# Patient Record
Sex: MALE | Race: WHITE | HISPANIC OR LATINO | Employment: FULL TIME | URBAN - METROPOLITAN AREA
[De-identification: names, ages, dates, MRNs, and addresses within clinical notes are randomized per-mention and may not be internally consistent; named-entity substitution may affect disease eponyms.]

---

## 2017-01-13 ENCOUNTER — HOSPITAL ENCOUNTER (EMERGENCY)
Facility: CLINIC | Age: 52
Discharge: HOME | End: 2017-01-13
Attending: EMERGENCY MEDICINE | Admitting: EMERGENCY MEDICINE
Payer: MEDICAID

## 2017-01-13 VITALS
OXYGEN SATURATION: 98 % | TEMPERATURE: 96 F | HEIGHT: 70 IN | DIASTOLIC BLOOD PRESSURE: 103 MMHG | BODY MASS INDEX: 27.2 KG/M2 | SYSTOLIC BLOOD PRESSURE: 167 MMHG | WEIGHT: 190 LBS | RESPIRATION RATE: 16 BRPM | HEART RATE: 78 BPM

## 2017-01-13 DIAGNOSIS — K04.01 PULPITIS: ICD-10-CM

## 2017-01-13 DIAGNOSIS — S02.5XXA CLOSED FRACTURE OF TOOTH, INITIAL ENCOUNTER: ICD-10-CM

## 2017-01-13 PROCEDURE — 99282 EMERGENCY DEPT VISIT SF MDM: CPT | Performed by: EMERGENCY MEDICINE

## 2017-01-13 PROCEDURE — 99284 EMERGENCY DEPT VISIT MOD MDM: CPT | Mod: Z6 | Performed by: EMERGENCY MEDICINE

## 2017-01-13 RX ORDER — OXYCODONE HYDROCHLORIDE 5 MG/1
5 TABLET ORAL EVERY 6 HOURS PRN
Qty: 10 TABLET | Refills: 0 | Status: SHIPPED | OUTPATIENT
Start: 2017-01-13 | End: 2017-11-15

## 2017-01-13 RX ORDER — PENICILLIN V POTASSIUM 500 MG/1
500 TABLET, FILM COATED ORAL 3 TIMES DAILY
Qty: 30 TABLET | Refills: 0 | Status: SHIPPED | OUTPATIENT
Start: 2017-01-13 | End: 2017-01-23

## 2017-01-13 ASSESSMENT — ENCOUNTER SYMPTOMS
TROUBLE SWALLOWING: 0
VOICE CHANGE: 0
SORE THROAT: 0
FEVER: 0

## 2017-01-13 NOTE — ED PROVIDER NOTES
History     Chief Complaint   Patient presents with     Dental Problem     HPI  Augustin Hernandez is a 51 year old male who presents for evaluation of dental pain. The patient reports that he has had ongoing dental pain of his right upper lateral incisor, which had a crack in it, and has been following with his dentist through [facility] about this. He states that he had dental appointment scheduled on 3 February to have this tooth pulled. 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 He states that this injury broke his incisor in half, and since then he has had worse pain and sensitivity persisting. He states that he has been taking ibuprofen around the clock and this has not been helping. He denies any trouble swallowing or sore throat, and he states he is eating and breathing normally except that these actions elicit pain. He denies any change in any of his other teeth new since this injury. He reports that he's not on any antibiotics.    I have reviewed the Medications, Allergies, Past Medical and Surgical History, and Social History in the Epic system.    No current facility-administered medications for this encounter.     Current Outpatient Prescriptions   Medication     penicillin V potassium (VEETID) 500 MG tablet     oxyCODONE (ROXICODONE) 5 MG IR tablet     IBUPROFEN PO     lisinopril (PRINIVIL,ZESTRIL) 20 MG tablet     UNKNOWN TO PATIENT     Past Medical History   Diagnosis Date     Hypertension        History reviewed. No pertinent past surgical history.    No family history on file.    Social History   Substance Use Topics     Smoking status: Current Every Day Smoker -- 1.00 packs/day     Smokeless tobacco: Not on file     Alcohol Use: No      No Known Allergies    Review of Systems   Constitutional: Negative for fever.   HENT: Positive for dental problem (right upper canine  "broken in half, painful and sensitive). Negative for sore throat, trouble swallowing and voice change.    All other systems reviewed and are negative.      Physical Exam   BP: (!) 167/103 mmHg  Pulse: 78  Temp: 96  F (35.6  C)  Resp: 16  Height: 177.8 cm (5' 10\")  Weight: 86.183 kg (190 lb)  SpO2: 98 %  Physical Exam  General: patient is alert and oriented and in no acute distress   Head: atraumatic and normocephalic   EENT: moist mucus membranes without tonsillar erythema or exudates, right upper lateral incisor is decayed and broken in half with dentin exposure, no pink area of pulp that is visualized, surrounding gingiva is erythematous and slightly edematous without fluctuance, no trismus, no induration of the submandibular tissue, pupils round and reactive   Neck: supple   Cardiovascular: regular rate and rhythm  Pulmonary: no respiratory distress  Musculoskeletal: normal range of motion   Neurological: alert and oriented, moving all extremities symmetrically, gait normal   Skin: warm, dry     ED Course   Procedures       12:37 AM  The patient was seen and examined by Geovanna Mccloud MD, in Room 20.        Critical Care time:  none               Labs Ordered and Resulted from Time of ED Arrival Up to the Time of Departure from the ED - No data to display    Assessments & Plan (with Medical Decision Making)    is a 51 year old male who presents for evaluation of dental pain.  History and exam are consistent with a dental fracture and associated pulpitis.  He does not have evidence of abscess that would require incision and drainage at this time.  He has no evidence of facial swelling or some associated cellulitis.  No trismus on exam.  He was given a prescription for penicillin and instructed to use ibuprofen regularly.  He was given oxycodone for breakthrough pain.  He is instructed to call his dentist in the morning to schedule an urgent follow-up.  He was given close return instructions and voiced " understanding.  Will discharge home.      I have reviewed the nursing notes.    I have reviewed the findings, diagnosis, plan and need for follow up with the patient.    Discharge Medication List as of 1/13/2017  1:00 AM      START taking these medications    Details   penicillin V potassium (VEETID) 500 MG tablet Take 1 tablet (500 mg) by mouth 3 times daily for 10 days, Disp-30 tablet, R-0, Local Print      oxyCODONE (ROXICODONE) 5 MG IR tablet Take 1 tablet (5 mg) by mouth every 6 hours as needed for pain, Disp-10 tablet, R-0, Local Print             Final diagnoses:   Closed fracture of tooth, initial encounter   Pulpitis     IAlli, am serving as a trained medical scribe to document services personally performed by Geovanna Mccloud MD, based on the provider's statements to me.      Geovanna MAO MD, was physically present and have reviewed and verified the accuracy of this note documented by Alli Turner.    1/13/2017   [facility], [place], EMERGENCY DEPARTMENT      Geovanna Mccloud MD  01/13/17 0311

## 2017-01-13 NOTE — ED AVS SNAPSHOT
[facility], Emergency Department    [address] [facility] [address]    Phone:  [phone]                                       Augustin Hernandez   MRN: [id number]    Department:  [facility], Emergency Department   Date of Visit:  1/13/2017           Patient Information     Date Of Birth          1965        Your diagnoses for this visit were:     Closed fracture of tooth, initial encounter     Pulpitis        You were seen by Geovanna Mccloud MD.        Discharge Instructions       Please make an appointment to follow up with your dentist to follow-up as soon as possible.  Let them know that you have a broken tooth with exposed dentin.  Take penicillin as prescribed.  Take 600 mg ibuprofen every 6 hours for no more than 5 days.  Use oxycodone as needed for break through pain.  If you develop worsening pain, fevers, facial swelling, difficulty swallowing or other concerns return to the emergency department for re-evaluation.      Dental Infection    An abscess is a pocket of pus at the tip of a tooth root in your jaw bone. It is caused by an infection at the root of the tooth. It can cause pain and swelling of the gum, cheek, or jaw. Pain may spread from the tooth to your ear or the area of your jaw on the same side. If the abscess isn t treated, it spreads to the gum near the tooth. This causes more swelling and pain. More serious infections cause your face to swell.  A dental abscess usually starts with a crack or cavity in the tooth. The pain is often made worse by drinking hot or cold fluids, or biting on hard foods.  Home care  Follow these guidelines when caring for yourself at home:    Avoid hot and cold foods and drinks. Your tooth may be sensitive to changes in temperature. Don t chew on the side of the infected tooth.    If your tooth is chipped or cracked, or if there is a large open cavity, put oil of cloves directly on the tooth to relieve pain. You can buy oil of  "cloves at drugstores. Some pharmacies carry an over-the-counter \"toothache kit.\" This contains a paste that you can put on the exposed tooth to make it less sensitive.    Put a cold pack on your jaw over the sore area to help reduce pain.    You may use acetaminophen or ibuprofen to ease pain, unless another medicine was prescribed. Note: If you have chronic liver or kidney disease, talk with your health care provider before using these medicines. Also talk with your provider if you ve had a stomach ulcer or GI bleeding.    An antibiotic will be prescribed. Take it until finished, even if you are feeling better after a few days.  Follow-up care  Follow up as directed with your dentist or an oral surgeon. Once an infection occurs in a tooth, it will continue to be a problem until the infection is drained. This is done through surgery or a root canal. Or you may need to have your tooth pulled.  When to seek medical advice  Call your health care provider right away if any of these occur:    Your face becomes more swollen or red    Your eyelids become swollen    Pain gets worse or spreads to your neck    Fever over 100.4  F (38.0  C)    Unusual drowsiness    Headache or stiff neck    Weakness or fainting    Pus drains from the tooth    Difficulty swallowing or breathing    0592-1082 [employer]. [address] [place] [address], [place] [address]. All rights reserved. This information is not intended as a substitute for professional medical care. Always follow your healthcare professional's instructions.                24 Hour Appointment Hotline       To make an appointment at any [facility], call [phone] ([phone]). If you don't have a family doctor or clinic, we will help you find one. [place] clinics are conveniently located to serve the needs of you and your family.             Review of your medicines      START taking        Dose / Directions Last dose taken    oxyCODONE 5 MG IR tablet " "  Commonly known as:  ROXICODONE   Dose:  5 mg   Quantity:  10 tablet        Take 1 tablet (5 mg) by mouth every 6 hours as needed for pain   Refills:  0        penicillin V potassium 500 MG tablet   Commonly known as:  VEETID   Dose:  500 mg   Quantity:  30 tablet        Take 1 tablet (500 mg) by mouth 3 times daily for 10 days   Refills:  0          Our records show that you are taking the medicines listed below. If these are incorrect, please call your family doctor or clinic.        Dose / Directions Last dose taken    IBUPROFEN PO        Refills:  0        lisinopril 20 MG tablet   Commonly known as:  PRINIVIL/ZESTRIL   Dose:  20 mg   Quantity:  30 tablet        Take 1 tablet by mouth daily.   Refills:  1        UNKNOWN TO PATIENT        Refills:  0                Prescriptions were sent or printed at these locations (2 Prescriptions)                   Other Prescriptions                Printed at Department/Unit printer (2 of 2)         penicillin V potassium (VEETID) 500 MG tablet               oxyCODONE (ROXICODONE) 5 MG IR tablet                Orders Needing Specimen Collection     None      Pending Results     No orders found from 1/12/2017 to 1/14/2017.            Pending Culture Results     No orders found from 1/12/2017 to 1/14/2017.            Thank you for choosing [facility]       Thank you for choosing [facility] for your care. Our goal is always to provide you with excellent care. Hearing back from our patients is one way we can continue to improve our services. Please take a few minutes to complete the written survey that you may receive in the mail after you visit with us. Thank you!        [employer] Information     [employer] lets you send messages to your doctor, view your test results, renew your prescriptions, schedule appointments and more. To sign up, go to [email or web address] . Click on \"Log in\" on the left side of the screen, which will take you to the Welcome page. Then click on \"Sign up " "Now\" on the right side of the page.     You will be asked to enter the access code listed below, as well as some personal information. Please follow the directions to create your username and password.     Your access code is: [email or web address]  Expires: 2017 12:49 AM     Your access code will  in 90 days. If you need help or a new code, please call your [facility] clinic or [phone].        Care EveryWhere ID     This is your Care EveryWhere ID. This could be used by other organizations to access your [facility] medical records  [id number]        After Visit Summary       This is your record. Keep this with you and show to your community pharmacist(s) and doctor(s) at your next visit.                  "

## 2017-01-13 NOTE — ED AVS SNAPSHOT
[facility], [place], Emergency Department    [address] [place] [address]    Phone:  [phone]                                       Augustin Hernandez   MRN: [id number]    Department:  [facility], Emergency Department   Date of Visit:  1/13/2017           After Visit Summary Signature Page     I have received my discharge instructions, and my questions have been answered. I have discussed any challenges I see with this plan with the nurse or doctor.    ..........................................................................................................................................  Patient/Patient Representative Signature      ..........................................................................................................................................  Patient Representative Print Name and Relationship to Patient    ..................................................               ................................................  Date                                            Time    ..........................................................................................................................................  Reviewed by Signature/Title    ...................................................              ..............................................  Date                                                            Time

## 2017-01-13 NOTE — ED NOTES
Pt presents in triage reporting breaking front teeth 3 days ago. Reports increasing pain. Reports taking >15 ibuprofen throughout day w/ no improvement.

## 2017-01-13 NOTE — DISCHARGE INSTRUCTIONS
"Please make an appointment to follow up with your dentist to follow-up as soon as possible.  Let them know that you have a broken tooth with exposed dentin.  Take penicillin as prescribed.  Take 600 mg ibuprofen every 6 hours for no more than 5 days.  Use oxycodone as needed for break through pain.  If you develop worsening pain, fevers, facial swelling, difficulty swallowing or other concerns return to the emergency department for re-evaluation.      Dental Infection    An abscess is a pocket of pus at the tip of a tooth root in your jaw bone. It is caused by an infection at the root of the tooth. It can cause pain and swelling of the gum, cheek, or jaw. Pain may spread from the tooth to your ear or the area of your jaw on the same side. If the abscess isn t treated, it spreads to the gum near the tooth. This causes more swelling and pain. More serious infections cause your face to swell.  A dental abscess usually starts with a crack or cavity in the tooth. The pain is often made worse by drinking hot or cold fluids, or biting on hard foods.  Home care  Follow these guidelines when caring for yourself at home:    Avoid hot and cold foods and drinks. Your tooth may be sensitive to changes in temperature. Don t chew on the side of the infected tooth.    If your tooth is chipped or cracked, or if there is a large open cavity, put oil of cloves directly on the tooth to relieve pain. You can buy oil of cloves at drugstores. Some pharmacies carry an over-the-counter \"toothache kit.\" This contains a paste that you can put on the exposed tooth to make it less sensitive.    Put a cold pack on your jaw over the sore area to help reduce pain.    You may use acetaminophen or ibuprofen to ease pain, unless another medicine was prescribed. Note: If you have chronic liver or kidney disease, talk with your health care provider before using these medicines. Also talk with your provider if you ve had a stomach ulcer or GI " bleeding.    An antibiotic will be prescribed. Take it until finished, even if you are feeling better after a few days.  Follow-up care  Follow up as directed with your dentist or an oral surgeon. Once an infection occurs in a tooth, it will continue to be a problem until the infection is drained. This is done through surgery or a root canal. Or you may need to have your tooth pulled.  When to seek medical advice  Call your health care provider right away if any of these occur:    Your face becomes more swollen or red    Your eyelids become swollen    Pain gets worse or spreads to your neck    Fever over 100.4  F (38.0  C)    Unusual drowsiness    Headache or stiff neck    Weakness or fainting    Pus drains from the tooth    Difficulty swallowing or breathing    0738-4688 [employer]. [address] [place] [address], [place] [address]. All rights reserved. This information is not intended as a substitute for professional medical care. Always follow your healthcare professional's instructions.

## 2017-09-17 ENCOUNTER — HOSPITAL ENCOUNTER (EMERGENCY)
Facility: CLINIC | Age: 52
Discharge: HOME | End: 2017-09-18
Attending: EMERGENCY MEDICINE | Admitting: EMERGENCY MEDICINE
Payer: COMMERCIAL

## 2017-09-17 ENCOUNTER — NURSE TRIAGE (OUTPATIENT)
Dept: NURSING | Facility: CLINIC | Age: 52
End: 2017-09-17

## 2017-09-17 DIAGNOSIS — I10 ESSENTIAL HYPERTENSION: ICD-10-CM

## 2017-09-17 PROCEDURE — 99284 EMERGENCY DEPT VISIT MOD MDM: CPT | Mod: Z6 | Performed by: EMERGENCY MEDICINE

## 2017-09-17 PROCEDURE — 99283 EMERGENCY DEPT VISIT LOW MDM: CPT | Performed by: EMERGENCY MEDICINE

## 2017-09-17 NOTE — ED AVS SNAPSHOT
[facility], [place], Emergency Department    [address]    [facility] [address]    Phone:  [phone]    Fax:  [phone]                                       Augustin Hernandez   MRN: [id number]    Department:  [facility], Emergency Department   Date of Visit:  9/17/2017           After Visit Summary Signature Page     I have received my discharge instructions, and my questions have been answered. I have discussed any challenges I see with this plan with the nurse or doctor.    ..........................................................................................................................................  Patient/Patient Representative Signature      ..........................................................................................................................................  Patient Representative Print Name and Relationship to Patient    ..................................................               ................................................  Date                                            Time    ..........................................................................................................................................  Reviewed by Signature/Title    ...................................................              ..............................................  Date                                                            Time

## 2017-09-17 NOTE — ED AVS SNAPSHOT
[facility], Emergency Department    [address]    [place] [address]    Phone:  [phone]    Fax:  [phone]                                       Augustin Hernandez   MRN: [id number]    Department:  [facility], Emergency Department   Date of Visit:  9/17/2017           Patient Information     Date Of Birth          1965        Your diagnoses for this visit were:     Essential hypertension        You were seen by Bola Wood MD.      Follow-up Information     Follow up with Clinic, [facility].    Contact information:    [address] [facility] [address]  [phone]          Discharge Instructions       Take blood pressure medication as directed.  Take ibuprofen if needed for pain.  Clinic follow up this week with your primary care clinic provider.  Return if persistent symptoms.    24 Hour Appointment Hotline       To make an appointment at any [facility], [place], call [phone] ([phone]). If you don't have a family doctor or clinic, we will help you find one. [facility] clinics are conveniently located to serve the needs of you and your family.             Review of your medicines      START taking        Dose / Directions Last dose taken    lisinopril-hydrochlorothiazide 20-25 MG per tablet   Commonly known as:  PRINZIDE/ZESTORETIC   Dose:  1 tablet   Quantity:  30 tablet        Take 1 tablet by mouth daily   Refills:  0          CONTINUE these medicines which may have CHANGED, or have new prescriptions. If we are uncertain of the size of tablets/capsules you have at home, strength may be listed as something that might have changed.        Dose / Directions Last dose taken    ibuprofen 800 MG tablet   Commonly known as:  ADVIL/MOTRIN   Dose:  800 mg   What changed:    - medication strength  - how much to take  - when to take this  - reasons to take this   Quantity:  40 tablet        Take 1 tablet (800 mg) by mouth every 8 hours as  needed for moderate pain   Refills:  0          Our records show that you are taking the medicines listed below. If these are incorrect, please call your family doctor or clinic.        Dose / Directions Last dose taken    oxyCODONE 5 MG IR tablet   Commonly known as:  ROXICODONE   Dose:  5 mg   Quantity:  10 tablet        Take 1 tablet (5 mg) by mouth every 6 hours as needed for pain   Refills:  0        UNKNOWN TO PATIENT        Refills:  0          STOP taking        Dose Reason for stopping Comments    lisinopril 20 MG tablet   Commonly known as:  PRINIVIL/ZESTRIL                      Prescriptions were sent or printed at these locations (2 Prescriptions)                   Other Prescriptions                Printed at Department/Unit printer (2 of 2)         ibuprofen (ADVIL/MOTRIN) 800 MG tablet               lisinopril-hydrochlorothiazide (PRINZIDE/ZESTORETIC) 20-25 MG per tablet                Orders Needing Specimen Collection     None      Pending Results     No orders found for last 3 day(s).            Pending Culture Results     No orders found for last 3 day(s).            Pending Results Instructions     If you had any lab results that were not finalized at the time of your Discharge, you can call the ED Lab Result RN at [phone]. You will be contacted by this team for any positive Lab results or changes in treatment. The nurses are available 7 days a week from 10A to 6:30P.  You can leave a message 24 hours per day and they will return your call.        Thank you for choosing [facility]       Thank you for choosing [facility] for your care. Our goal is always to provide you with excellent care. Hearing back from our patients is one way we can continue to improve our services. Please take a few minutes to complete the written survey that you may receive in the mail after you visit with us. Thank you!        [employer] Information     [employer] lets you send messages to your doctor, view your test results,  "renew your prescriptions, schedule appointments and more. To sign up, go to [email or web address] . Click on \"Log in\" on the left side of the screen, which will take you to the Welcome page. Then click on \"Sign up Now\" on the right side of the page.     You will be asked to enter the access code listed below, as well as some personal information. Please follow the directions to create your username and password.     Your access code is: [email or web address]  Expires: 2017  1:13 AM     Your access code will  in 90 days. If you need help or a new code, please call your [facility] clinic or [phone].        Care EveryWhere ID     This is your Care EveryWhere ID. This could be used by other organizations to access your [facility] medical records  [id number]        Equal Access to Services     [facility] : Rashawn Dawkins, anuardha gonzalez, piter self, magda joseph . So [facility] [phone].    ATENCIÓN: Si habla español, tiene a oro disposición servicios gratuitos de asistencia lingüística. Llame al [phone].    We comply with applicable federal civil rights laws and [place] laws. We do not discriminate on the basis of race, color, national origin, age, disability sex, sexual orientation or gender identity.            After Visit Summary       This is your record. Keep this with you and show to your community pharmacist(s) and doctor(s) at your next visit.                  "

## 2017-09-18 VITALS
RESPIRATION RATE: 18 BRPM | WEIGHT: 191.7 LBS | SYSTOLIC BLOOD PRESSURE: 156 MMHG | OXYGEN SATURATION: 98 % | DIASTOLIC BLOOD PRESSURE: 112 MMHG | TEMPERATURE: 97.4 F | BODY MASS INDEX: 27.51 KG/M2

## 2017-09-18 PROCEDURE — 25000132 ZZH RX MED GY IP 250 OP 250 PS 637: Performed by: EMERGENCY MEDICINE

## 2017-09-18 RX ORDER — LISINOPRIL 20 MG/1
20 TABLET ORAL DAILY
Qty: 30 TABLET | Refills: 1 | Status: SHIPPED | OUTPATIENT
Start: 2017-09-18 | End: 2017-09-18

## 2017-09-18 RX ORDER — IBUPROFEN 800 MG/1
800 TABLET, FILM COATED ORAL ONCE
Status: COMPLETED | OUTPATIENT
Start: 2017-09-18 | End: 2017-09-18

## 2017-09-18 RX ORDER — LISINOPRIL AND HYDROCHLOROTHIAZIDE 20; 25 MG/1; MG/1
1 TABLET ORAL DAILY
Qty: 30 TABLET | Refills: 0 | Status: SHIPPED | OUTPATIENT
Start: 2017-09-18 | End: 2019-01-14

## 2017-09-18 RX ORDER — IBUPROFEN 800 MG/1
800 TABLET, FILM COATED ORAL EVERY 8 HOURS PRN
Qty: 40 TABLET | Refills: 0 | Status: SHIPPED | OUTPATIENT
Start: 2017-09-18 | End: 2017-09-26

## 2017-09-18 RX ORDER — LISINOPRIL AND HYDROCHLOROTHIAZIDE 20; 25 MG/1; MG/1
1 TABLET ORAL ONCE
Status: COMPLETED | OUTPATIENT
Start: 2017-09-18 | End: 2017-09-18

## 2017-09-18 RX ADMIN — IBUPROFEN 800 MG: 800 TABLET ORAL at 00:27

## 2017-09-18 RX ADMIN — LISINOPRIL AND HYDROCHLOROTHIAZIDE 1 TABLET: 25; 20 TABLET ORAL at 01:06

## 2017-09-18 ASSESSMENT — ENCOUNTER SYMPTOMS
LIGHT-HEADEDNESS: 0
FACIAL SWELLING: 0
SHORTNESS OF BREATH: 0
ABDOMINAL PAIN: 0
DIAPHORESIS: 0
NECK STIFFNESS: 0
RHINORRHEA: 0
SINUS PRESSURE: 0
BACK PAIN: 0
DIZZINESS: 0
DIFFICULTY URINATING: 0
HEADACHES: 0
BRUISES/BLEEDS EASILY: 0
CONFUSION: 0
MYALGIAS: 0
CONSTITUTIONAL NEGATIVE: 1
FLANK PAIN: 0
NAUSEA: 0
NECK PAIN: 0
FATIGUE: 0
SEIZURES: 0
VOMITING: 0
DYSURIA: 0
FEVER: 0
JOINT SWELLING: 0
SORE THROAT: 0
NUMBNESS: 0
DIARRHEA: 0
ARTHRALGIAS: 0
UNEXPECTED WEIGHT CHANGE: 0
CHEST TIGHTNESS: 0
WEAKNESS: 0
CHILLS: 0
COUGH: 0
PALPITATIONS: 0

## 2017-09-18 NOTE — DISCHARGE INSTRUCTIONS
Take blood pressure medication as directed.  Take ibuprofen if needed for pain.  Clinic follow up this week with your primary care clinic provider.  Return if persistent symptoms.

## 2017-09-18 NOTE — TELEPHONE ENCOUNTER
"  Reason for Disposition    [1] SEVERE headache (e.g., excruciating) AND [2] \"worst headache\" of life    Protocols used: HEADACHE-ADULT-AH    "

## 2017-09-18 NOTE — ED PROVIDER NOTES
History     Chief Complaint   Patient presents with     Hypertension     Pt ran out of lisinopril & clonidine last week, feels BP is high.     Headache     x 1 day     HPI  Augustin Hernandez is a 52 year old male who presents with history of hypertension who presents stating he is out of his antihypertensive medication. Mild headache. No neurologic symptoms. No chest pain or dyspnea. No exertional symptoms. No leg swelling. No visual loss. No recent illness or injury. Does report some soreness of his back that is aggravated by movement. No trauma. No  symptoms. No fever or chills. No saddle area anesthesia. No sensory or motor symptoms. No bowel or bladder dysfunction. No incontinence or retention. No IV drug use.     I have reviewed the Medications, Allergies, Past Medical and Surgical History, and Social History in the [facility] system.  Past Medical History:   Diagnosis Date     Hypertension        Review of Systems   Constitutional: Negative.  Negative for chills, diaphoresis, fatigue, fever and unexpected weight change.   HENT: Negative for congestion, facial swelling, mouth sores, rhinorrhea, sinus pressure and sore throat.    Eyes: Negative for visual disturbance.   Respiratory: Negative for cough, chest tightness and shortness of breath.    Cardiovascular: Negative for chest pain, palpitations and leg swelling.   Gastrointestinal: Negative for abdominal pain, diarrhea, nausea and vomiting.   Genitourinary: Negative for difficulty urinating, dysuria and flank pain.   Musculoskeletal: Negative for arthralgias, back pain, joint swelling, myalgias, neck pain and neck stiffness.   Skin: Negative for rash.   Allergic/Immunologic: Negative for immunocompromised state.   Neurological: Negative for dizziness, seizures, syncope, weakness, light-headedness, numbness and headaches.   Hematological: Does not bruise/bleed easily.   Psychiatric/Behavioral: Negative for confusion.       Physical Exam   BP: (!)  155/103  Heart Rate: 70  Temp: 96.1  F (35.6  C)  Resp: 18  Weight: 87 kg (191 lb 11.2 oz)  SpO2: 99 %  Physical Exam   Constitutional: He is oriented to person, place, and time. He appears well-developed and well-nourished. No distress.   HENT:   Head: Normocephalic and atraumatic.   Mouth/Throat: Oropharynx is clear and moist.   Eyes: Conjunctivae and EOM are normal. Pupils are equal, round, and reactive to light.   Visual fundi normal.   Neck: Normal range of motion. Neck supple.   Cardiovascular: Normal rate, regular rhythm, normal heart sounds and intact distal pulses.    Pulmonary/Chest: Effort normal and breath sounds normal. No respiratory distress.   Abdominal: Soft. Bowel sounds are normal. There is no tenderness.   Musculoskeletal: Normal range of motion. He exhibits no edema or tenderness.   Neurological: He is alert and oriented to person, place, and time. He has normal strength and normal reflexes. He displays normal reflexes. No cranial nerve deficit or sensory deficit. Coordination normal.   Normal, non-focal exam.   Skin: Skin is warm and dry. No rash noted.   Psychiatric: He has a normal mood and affect. His behavior is normal.   Nursing note and vitals reviewed.      ED Course     ED Course     Procedures             Critical Care time:  none             Labs Ordered and Resulted from Time of ED Arrival Up to the Time of Departure from the ED - No data to display         Assessments & Plan (with Medical Decision Making)   Essential hypertension. No evidence of hypertensive emergency. Back pain associated with paravertebral tenderness most likely muscle strain. Will restart lisinopril-HCTZ. Cannot find any record of clonidine so will have him follow up with his primary care provider regarding this. Ibuprofen as needed for pain in back. Clinic follow up this week. Discussed the importance of maintaining compliance with antihypertensive medications. Return if persistent symptoms.     I have reviewed  the nursing notes.    I have reviewed the findings, diagnosis, plan and need for follow up with the patient.    New Prescriptions    IBUPROFEN (ADVIL/MOTRIN) 800 MG TABLET    Take 1 tablet (800 mg) by mouth every 8 hours as needed for moderate pain    LISINOPRIL-HYDROCHLOROTHIAZIDE (PRINZIDE/ZESTORETIC) 20-25 MG PER TABLET    Take 1 tablet by mouth daily       Final diagnoses:   Essential hypertension   I, Waldemar Jimenez, am serving as a trained medical scribe to document services personally performed by Bola Wood MD, based on the provider's statements to me.      IBola MD, was physically present and have reviewed and verified the accuracy of this note documented by Waldemar Jimenez.       9/17/2017   [facility], [place], EMERGENCY DEPARTMENT     Bola Wood MD  09/18/17 0142

## 2017-11-15 ENCOUNTER — HOSPITAL ENCOUNTER (EMERGENCY)
Facility: CLINIC | Age: 52
Discharge: HOME | End: 2017-11-16
Attending: EMERGENCY MEDICINE | Admitting: EMERGENCY MEDICINE
Payer: COMMERCIAL

## 2017-11-15 DIAGNOSIS — K08.89 PAIN, DENTAL: ICD-10-CM

## 2017-11-15 LAB
BASOPHILS # BLD AUTO: 0 10E9/L (ref 0–0.2)
BASOPHILS NFR BLD AUTO: 0.1 %
CO2 BLDCOV-SCNC: 25 MMOL/L (ref 21–28)
DIFFERENTIAL METHOD BLD: NORMAL
EOSINOPHIL # BLD AUTO: 0.2 10E9/L (ref 0–0.7)
EOSINOPHIL NFR BLD AUTO: 1.5 %
ERYTHROCYTE [DISTWIDTH] IN BLOOD BY AUTOMATED COUNT: 12.2 % (ref 10–15)
HCT VFR BLD AUTO: 49 % (ref 40–53)
HGB BLD-MCNC: 17.3 G/DL (ref 13.3–17.7)
IMM GRANULOCYTES # BLD: 0 10E9/L (ref 0–0.4)
IMM GRANULOCYTES NFR BLD: 0.2 %
LACTATE BLD-SCNC: 1.4 MMOL/L (ref 0.7–2.1)
LYMPHOCYTES # BLD AUTO: 2.5 10E9/L (ref 0.8–5.3)
LYMPHOCYTES NFR BLD AUTO: 24.6 %
MCH RBC QN AUTO: 32.6 PG (ref 26.5–33)
MCHC RBC AUTO-ENTMCNC: 35.3 G/DL (ref 31.5–36.5)
MCV RBC AUTO: 93 FL (ref 78–100)
MONOCYTES # BLD AUTO: 0.6 10E9/L (ref 0–1.3)
MONOCYTES NFR BLD AUTO: 5.8 %
NEUTROPHILS # BLD AUTO: 6.9 10E9/L (ref 1.6–8.3)
NEUTROPHILS NFR BLD AUTO: 67.8 %
NRBC # BLD AUTO: 0 10*3/UL
NRBC BLD AUTO-RTO: 0 /100
PCO2 BLDV: 39 MM HG (ref 40–50)
PH BLDV: 7.41 PH (ref 7.32–7.43)
PLATELET # BLD AUTO: 184 10E9/L (ref 150–450)
PO2 BLDV: 81 MM HG (ref 25–47)
RBC # BLD AUTO: 5.3 10E12/L (ref 4.4–5.9)
SAO2 % BLDV FROM PO2: 96 %
WBC # BLD AUTO: 10.2 10E9/L (ref 4–11)

## 2017-11-15 PROCEDURE — 64400 NJX AA&/STRD TRIGEMINAL NRV: CPT | Mod: Z6 | Performed by: EMERGENCY MEDICINE

## 2017-11-15 PROCEDURE — 64400 NJX AA&/STRD TRIGEMINAL NRV: CPT | Performed by: EMERGENCY MEDICINE

## 2017-11-15 PROCEDURE — 82803 BLOOD GASES ANY COMBINATION: CPT

## 2017-11-15 PROCEDURE — 99283 EMERGENCY DEPT VISIT LOW MDM: CPT | Mod: 25 | Performed by: EMERGENCY MEDICINE

## 2017-11-15 PROCEDURE — 83605 ASSAY OF LACTIC ACID: CPT

## 2017-11-15 PROCEDURE — 85025 COMPLETE CBC W/AUTO DIFF WBC: CPT | Performed by: EMERGENCY MEDICINE

## 2017-11-15 PROCEDURE — 80053 COMPREHEN METABOLIC PANEL: CPT | Performed by: EMERGENCY MEDICINE

## 2017-11-15 RX ORDER — BUPIVACAINE HYDROCHLORIDE 5 MG/ML
1 INJECTION, SOLUTION EPIDURAL; INTRACAUDAL ONCE
Status: DISCONTINUED | OUTPATIENT
Start: 2017-11-15 | End: 2017-11-16 | Stop reason: HOSPADM

## 2017-11-15 ASSESSMENT — ENCOUNTER SYMPTOMS
NECK PAIN: 0
WEAKNESS: 0
COLOR CHANGE: 0
FREQUENCY: 0
ABDOMINAL PAIN: 0
BRUISES/BLEEDS EASILY: 0
VOMITING: 0
BLOOD IN STOOL: 0
SHORTNESS OF BREATH: 0
NERVOUS/ANXIOUS: 0
NUMBNESS: 0
VOICE CHANGE: 0
LIGHT-HEADEDNESS: 0
HEMATURIA: 0
DYSURIA: 0
PALPITATIONS: 0
POLYDIPSIA: 0
DIAPHORESIS: 0
CHILLS: 0
SORE THROAT: 0
ADENOPATHY: 0
FEVER: 0
DIZZINESS: 0
EYE PAIN: 0
NAUSEA: 0
HEADACHES: 0
BACK PAIN: 0
DIARRHEA: 0
DYSPHORIC MOOD: 0
CONSTIPATION: 0
TROUBLE SWALLOWING: 0
COUGH: 0

## 2017-11-15 NOTE — ED AVS SNAPSHOT
[facility], [place], Emergency Department    [address] [place] [address]    Phone:  [phone]                                       Augustin Hernandez   MRN: [id number]    Department:  [facility], Emergency Department   Date of Visit:  11/15/2017           After Visit Summary Signature Page     I have received my discharge instructions, and my questions have been answered. I have discussed any challenges I see with this plan with the nurse or doctor.    ..........................................................................................................................................  Patient/Patient Representative Signature      ..........................................................................................................................................  Patient Representative Print Name and Relationship to Patient    ..................................................               ................................................  Date                                            Time    ..........................................................................................................................................  Reviewed by Signature/Title    ...................................................              ..............................................  Date                                                            Time

## 2017-11-15 NOTE — ED AVS SNAPSHOT
[facility], [place], Emergency Department    [address] [facility] [address]    Phone:  [phone]                                       Augustin Hernandez   MRN: [id number]    Department:  [facility], [place], Emergency Department   Date of Visit:  11/15/2017           Patient Information     Date Of Birth          1965        Your diagnoses for this visit were:     Pain, dental        You were seen by Pita Marie MD.        Discharge Instructions       TODAY'S VISIT:  You were seen today for dental pain.  - You did not have any findings for compromising of your airway or breathing based upon your presentation today, but things can always change and we need you to watch very closely for any new or worsening symptoms and immediately return to the Emergency Department at any time for any new or worsening symptoms as we discussed here today, especially with regards to any type of breathing troubles or inability to swallowing your own saliva, etc.     FOLLOW-UP:  Please make an appointment to follow up with:  - Your usual Dentist. Call to see if your appointment can be moved up.   - You could also try to be seen at our Dental Clinic [facility]   - Many of these clinics offer a sliding fee option for patients that qualify, and see patients on a walk-in or same day basis. Please call each clinic directly. As services, hours, fees and policies vary greatly.    [place]:  [facility]     [phone]  [facility]) [phone]  [facility]  [phone]  [facility]      [phone]   Community Clinic    [phone]  [facility]  [phone]  [facility] [phone]  [facility]     [phone]  [facility]   [phone]  [facility]  "([place] only)   [phone]  [facility]    [phone] (adults)          [phone] (children)    [place]:  [facility]     [phone]; [phone]  [facility]     [phone]  [facility]     [phone]  [facility] [place] (free, limited)    [phone]    Multiple Locations:  [facility]       [phone]      PRESCRIPTIONS / MEDICATIONS:  - You can use Ibuprofen (600mg up to every 6-8 hours) and/or Tylenol/acetaminophen (1000mg up to every 8 hours) as needed for pain/symptom control.   - You can also try over the counter Orajel on the affected area, but do not exceed the dosing recommendations on the package.   - Please take the prescribed antibiotics until complete or told otherwise by a Medical or Dental provider.     OTHER INSTRUCTIONS:  - Do your best to stay hydrated.  - Try to avoid overly hot, cold, spicy, acidic or salty foods as this may worsen you symptoms.     RETURN TO THE EMERGENCY DEPARTMENT  Return to the Emergency Department at any time for new/worsening symptoms.        *DENTAL PAIN    A crack or cavity in the tooth, which exposes the sensitive inner area of the tooth can cause tooth pain. An infection in the gum or the root of the tooth can cause pain and swelling. The pain is often made worse by drinking hot or cold fluids, or biting on hard foods. Pain may spread from the tooth to the ear or jaw on the same side.  HOME CARE:  1. Avoid hot and cold foods and liquids since your tooth may be sensitive to temperature changes.  2. If your tooth is chipped or cracked, or if there is a large open cavity, apply OIL OF CLOVES (available over-the-counter in drug stores) directly to the tooth to reduce pain. Some pharmacies carry an over-the-counter \"toothache kit.\" This contains a paste, which can be applied over the exposed tooth to decrease sensitivity. This is only a temporary solution. See a dentist as soon as possible " to fix the tooth.  3. A cold pack on your jaw over the sore area may help reduce pain.  4. You may use acetaminophen (Tylenol) 650-1000 mg every 6 hours or ibuprofen (Motrin, Advil) 600 mg every 6-8 hours with food to control pain, if you are able to take these medicines. [ NOTE: If you have chronic liver or kidney disease or ever had a stomach ulcer or GI bleeding, talk with your doctor before using these medicines.]  5. If you have signs of an infection, an antibiotic will be given. Take it as directed.  FOLLOW-UP as directed with a dentist. Your pain may go away with the treatment given. However, only a dentist can fully evaluate and treat the cause and prevent the pain from coming back again.  TOOTHACHE IS A SIGN OF DISEASE IN YOUR TOOTH AND SHOULD BE EXAMINED AND TREATED BY A DENTIST.  GET PROMPT MEDICAL ATTENTION if any of the following occur:    Your face becomes swollen or red    Pain worsens or spreads to the neck    Fever over 101  F (38.3  C)    Unusual drowsiness; headache or stiff neck; weakness or fainting    Pus drains from the tooth    Difficulty swallowing or breathing       4915-0283 [employer]. [address] [place] [address]. All rights reserved. This information is not intended as a substitute for professional medical care. Always follow your healthcare professional's instructions.  This information has been modified by your health care provider with permission from the publisher.      24 Hour Appointment Hotline       To make an appointment at any [facility], call [phone]). If you don't have a family doctor or clinic, we will help you find one. [facility] clinics are conveniently located to serve the needs of you and your family.             Review of your medicines      START taking        Dose / Directions Last dose taken    penicillin V potassium 500 MG tablet   Commonly known as:  VEETID   Dose:  500 mg   Quantity:  28 tablet        Take 1  tablet (500 mg) by mouth 4 times daily for 7 days   Refills:  0          Our records show that you are taking the medicines listed below. If these are incorrect, please call your family doctor or clinic.        Dose / Directions Last dose taken    lisinopril-hydrochlorothiazide 20-25 MG per tablet   Commonly known as:  PRINZIDE/ZESTORETIC   Dose:  1 tablet   Quantity:  30 tablet        Take 1 tablet by mouth daily   Refills:  0        UNKNOWN TO PATIENT        Refills:  0                Prescriptions were sent or printed at these locations (1 Prescription)                   Other Prescriptions                Printed at Department/Unit printer (1 of 1)         penicillin V potassium (VEETID) 500 MG tablet                Procedures and tests performed during your visit     CBC with platelets differential    Comprehensive metabolic panel    ISTAT CG4 gases lactate makenzie nursing POCT    ISTAT gases lactate makenzie POCT      Orders Needing Specimen Collection     None      Pending Results     No orders found for last 3 day(s).            Pending Culture Results     No orders found for last 3 day(s).            Pending Results Instructions     If you had any lab results that were not finalized at the time of your Discharge, you can call the ED Lab Result RN at [phone]. You will be contacted by this team for any positive Lab results or changes in treatment. The nurses are available 7 days a week from 10A to 6:30P.  You can leave a message 24 hours per day and they will return your call.        Thank you for choosing [facility]       Thank you for choosing [facility] for your care. Our goal is always to provide you with excellent care. Hearing back from our patients is one way we can continue to improve our services. Please take a few minutes to complete the written survey that you may receive in the mail after you visit with us. Thank you!        [employer] Information     [employer] lets you send messages to your doctor, view your  "test results, renew your prescriptions, schedule appointments and more. To sign up, go to [email or web address] . Click on \"Log in\" on the left side of the screen, which will take you to the Welcome page. Then click on \"Sign up Now\" on the right side of the page.     You will be asked to enter the access code listed below, as well as some personal information. Please follow the directions to create your username and password.     Your access code is: [email or web address]  Expires: 2017 12:13 AM     Your access code will  in 90 days. If you need help or a new code, please call your [place] clinic or [phone].        Care EveryWhere ID     This is your Care EveryWhere ID. This could be used by other organizations to access your [place] medical records  [id number]        Equal Access to Services     [facility] : Hadshakila Dawkins, wajorge gonzalez, piter kaalmagera self, magda joseph . So [facility] [phone].    ATENCIÓN: Si habla español, tiene a oro disposición servicios gratuitos de asistencia lingüística. Llame al [phone].    We comply with applicable federal civil rights laws and [place] laws. We do not discriminate on the basis of race, color, national origin, age, disability, sex, sexual orientation, or gender identity.            After Visit Summary       This is your record. Keep this with you and show to your community pharmacist(s) and doctor(s) at your next visit.                  "

## 2017-11-16 VITALS
OXYGEN SATURATION: 99 % | DIASTOLIC BLOOD PRESSURE: 111 MMHG | TEMPERATURE: 98.1 F | RESPIRATION RATE: 16 BRPM | SYSTOLIC BLOOD PRESSURE: 151 MMHG | HEART RATE: 76 BPM

## 2017-11-16 LAB
ALBUMIN SERPL-MCNC: 3.7 G/DL (ref 3.4–5)
ALP SERPL-CCNC: 59 U/L (ref 40–150)
ALT SERPL W P-5'-P-CCNC: 25 U/L (ref 0–70)
ANION GAP SERPL CALCULATED.3IONS-SCNC: 9 MMOL/L (ref 3–14)
AST SERPL W P-5'-P-CCNC: 12 U/L (ref 0–45)
BILIRUB SERPL-MCNC: 0.4 MG/DL (ref 0.2–1.3)
BUN SERPL-MCNC: 27 MG/DL (ref 7–30)
CALCIUM SERPL-MCNC: 8.6 MG/DL (ref 8.5–10.1)
CHLORIDE SERPL-SCNC: 107 MMOL/L (ref 94–109)
CO2 SERPL-SCNC: 25 MMOL/L (ref 20–32)
CREAT SERPL-MCNC: 0.98 MG/DL (ref 0.66–1.25)
GFR SERPL CREATININE-BSD FRML MDRD: 81 ML/MIN/1.7M2
GLUCOSE SERPL-MCNC: 121 MG/DL (ref 70–99)
POTASSIUM SERPL-SCNC: 3.3 MMOL/L (ref 3.4–5.3)
PROT SERPL-MCNC: 7.3 G/DL (ref 6.8–8.8)
SODIUM SERPL-SCNC: 140 MMOL/L (ref 133–144)

## 2017-11-16 PROCEDURE — 25000132 ZZH RX MED GY IP 250 OP 250 PS 637: Performed by: EMERGENCY MEDICINE

## 2017-11-16 RX ORDER — PENICILLIN V POTASSIUM 500 MG/1
500 TABLET, FILM COATED ORAL 4 TIMES DAILY
Qty: 28 TABLET | Refills: 0 | Status: SHIPPED | OUTPATIENT
Start: 2017-11-16 | End: 2017-11-23

## 2017-11-16 RX ORDER — PENICILLIN V POTASSIUM 500 MG/1
500 TABLET, FILM COATED ORAL ONCE
Status: COMPLETED | OUTPATIENT
Start: 2017-11-16 | End: 2017-11-16

## 2017-11-16 RX ADMIN — PENICILLIN V POTASSIUM 500 MG: 500 TABLET, FILM COATED ORAL at 01:36

## 2017-11-16 NOTE — DISCHARGE INSTRUCTIONS
TODAY'S VISIT:  You were seen today for dental pain.  - You did not have any findings for compromising of your airway or breathing based upon your presentation today, but things can always change and we need you to watch very closely for any new or worsening symptoms and immediately return to the Emergency Department at any time for any new or worsening symptoms as we discussed here today, especially with regards to any type of breathing troubles or inability to swallowing your own saliva, etc.     FOLLOW-UP:  Please make an appointment to follow up with:  - Your usual Dentist. Call to see if your appointment can be moved up.   - You could also try to be seen at our [facility]   - Many of these clinics offer a sliding fee option for patients that qualify, and see patients on a walk-in or same day basis. Please call each clinic directly. As services, hours, fees and policies vary greatly.    [place]:  [facility]     [phone]  [facility] ([facility]) [phone]  [facility]  [phone]  [facility]      [phone]   Community Clinic    [phone]  [facility]  [phone]  [facility] [phone]  [facility]     [phone]  [facility]   [phone]  [facility] (cash only)   [phone]  [facility]    [phone] (adults)          [phone] (children)    [place]:  [facility]     [phone]; [phone]  [facility]     [phone]  [facility]     [phone]  [facility] (free, limited)    [phone]    Multiple Locations:  [facility]       [phone]      PRESCRIPTIONS / MEDICATIONS:  - You can use Ibuprofen (600mg up to every 6-8 hours)  "and/or Tylenol/acetaminophen (1000mg up to every 8 hours) as needed for pain/symptom control.   - You can also try over the counter Orajel on the affected area, but do not exceed the dosing recommendations on the package.   - Please take the prescribed antibiotics until complete or told otherwise by a Medical or Dental provider.     OTHER INSTRUCTIONS:  - Do your best to stay hydrated.  - Try to avoid overly hot, cold, spicy, acidic or salty foods as this may worsen you symptoms.     RETURN TO THE EMERGENCY DEPARTMENT  Return to the Emergency Department at any time for new/worsening symptoms.        *DENTAL PAIN    A crack or cavity in the tooth, which exposes the sensitive inner area of the tooth can cause tooth pain. An infection in the gum or the root of the tooth can cause pain and swelling. The pain is often made worse by drinking hot or cold fluids, or biting on hard foods. Pain may spread from the tooth to the ear or jaw on the same side.  HOME CARE:  1. Avoid hot and cold foods and liquids since your tooth may be sensitive to temperature changes.  2. If your tooth is chipped or cracked, or if there is a large open cavity, apply OIL OF CLOVES (available over-the-counter in drug stores) directly to the tooth to reduce pain. Some pharmacies carry an over-the-counter \"toothache kit.\" This contains a paste, which can be applied over the exposed tooth to decrease sensitivity. This is only a temporary solution. See a dentist as soon as possible to fix the tooth.  3. A cold pack on your jaw over the sore area may help reduce pain.  4. You may use acetaminophen (Tylenol) 650-1000 mg every 6 hours or ibuprofen (Motrin, Advil) 600 mg every 6-8 hours with food to control pain, if you are able to take these medicines. [ NOTE: If you have chronic liver or kidney disease or ever had a stomach ulcer or GI bleeding, talk with your doctor before using these medicines.]  5. If you have signs of an infection, an antibiotic will " be given. Take it as directed.  FOLLOW-UP as directed with a dentist. Your pain may go away with the treatment given. However, only a dentist can fully evaluate and treat the cause and prevent the pain from coming back again.  TOOTHACHE IS A SIGN OF DISEASE IN YOUR TOOTH AND SHOULD BE EXAMINED AND TREATED BY A DENTIST.  GET PROMPT MEDICAL ATTENTION if any of the following occur:    Your face becomes swollen or red    Pain worsens or spreads to the neck    Fever over 101  F (38.3  C)    Unusual drowsiness; headache or stiff neck; weakness or fainting    Pus drains from the tooth    Difficulty swallowing or breathing       0466-9864 [employer]. [address] [place] [address], [place] [id number]. All rights reserved. This information is not intended as a substitute for professional medical care. Always follow your healthcare professional's instructions.  This information has been modified by your health care provider with permission from the publisher.

## 2017-11-16 NOTE — ED PROVIDER NOTES
History     Chief Complaint   Patient presents with     Dental Pain     HPI  Augustin Hernandez is a 52 year old male with a history of hypertension who presents for evaluation of dental pain.     Patient complains of severe left lower dental pain which has been ongoing for the past five days. He states his dental pain has caused pain in the left-side of his face near the jaw there near the left lower teeth near angle of mandible. He denies noticing any pus or drainage from the wound. His pain is most severe when he is chewing and his left upper teeth hit his left lower teeth, causing worsening of the lower teeth pain. He believes he may have lost a filling (no concern for swallowing/choking reported).   He denies fever or chills. No vomiting, difficulty breathing, or difficulty swallowing. No chest pain or palpitations. No neck or back pain or stiffness. No trouble swallowing. No change in voice. He has otherwise been feeling well. He is just concerned w/ the worsening pain as his daughter's Bday is tomorrow and he wanted to be able to fully enjoy/participate.     This morning he did take eight 800 mg doses of ibuprofen today for his pain with no relief. No abdominal pain, no urination changes or sx's. He has not tried taking any Tylenol.  He is not currently prescribed any antibiotics. He does have a dental clinic, says he was seen today, and does have an appointment scheduled on 11/22/17, one week from now. He is a smoker, but states he is trying to start to quit. He denies alcohol use or substance abuse.  Denies any immunocompromising conditions.    No other symptoms or complaints at this time. Please see ROS for further details.    I have reviewed the Medications, Allergies, Past Medical and Surgical History, and Social History in the ticckle system.  Past Medical History:   Diagnosis Date     Hypertension        History reviewed. No pertinent surgical history.    No family history on file.    Social  History   Substance Use Topics     Smoking status: Current Every Day Smoker     Packs/day: 0.50     Smokeless tobacco: Never Used     Alcohol use No       No current facility-administered medications for this encounter.      Current Outpatient Prescriptions   Medication     lisinopril-hydrochlorothiazide (PRINZIDE/ZESTORETIC) 20-25 MG per tablet     UNKNOWN TO PATIENT      No Known Allergies    Review of Systems   Constitutional: Negative for chills, diaphoresis and fever.   HENT: Positive for dental problem (dental pain). Negative for drooling, ear pain, facial swelling, mouth sores, rhinorrhea, sore throat, tinnitus, trouble swallowing and voice change.    Eyes: Negative for pain and visual disturbance.   Respiratory: Negative for cough and shortness of breath.    Cardiovascular: Negative for chest pain, palpitations and leg swelling.   Gastrointestinal: Negative for abdominal pain, blood in stool, constipation, diarrhea, nausea and vomiting.   Endocrine: Negative for polydipsia and polyuria.   Genitourinary: Negative for dysuria, frequency, hematuria and urgency.   Musculoskeletal: Negative for back pain and neck pain.   Skin: Negative for color change and rash.   Allergic/Immunologic: Negative for immunocompromised state.   Neurological: Negative for dizziness, weakness, light-headedness, numbness and headaches.   Hematological: Negative for adenopathy. Does not bruise/bleed easily.   Psychiatric/Behavioral: Negative for dysphoric mood. The patient is not nervous/anxious.        Physical Exam   BP: (!) 151/111  Pulse: 82  Temp: 98.1  F (36.7  C)  Resp: 16  SpO2: 99 %      Physical Exam   Constitutional: He appears well-developed and well-nourished. He is active.  Non-toxic appearance. He does not have a sickly appearance. He does not appear ill. No distress.   HENT:   Head: Normocephalic. Head is without raccoon's eyes, without Mills's sign, without abrasion, without contusion, without laceration, without right  periorbital erythema and without left periorbital erythema. Hair is normal.   Right Ear: External ear normal. No drainage.   Left Ear: External ear normal. No drainage.   Nose: Nose normal. No rhinorrhea. No epistaxis.   Mouth/Throat: Uvula is midline, oropharynx is clear and moist and mucous membranes are normal. No oral lesions. No trismus in the jaw. No uvula swelling. No oropharyngeal exudate, posterior oropharyngeal edema, posterior oropharyngeal erythema or tonsillar abscesses.       No midface pain, fullness or fluctance   Eyes: Conjunctivae are normal. No scleral icterus.   Neck: Normal range of motion. Neck supple. No tracheal deviation present.   Cardiovascular: Normal rate and regular rhythm.    Pulmonary/Chest: Effort normal. No stridor. No respiratory distress.   Abdominal: He exhibits no distension.   Musculoskeletal:        Cervical back: He exhibits normal range of motion, no tenderness, no swelling, no edema, no deformity and no pain.   No meningeal findings. Extremities warm and seemingly well perfused.   Lymphadenopathy:        Head (right side): No submental, no submandibular, no tonsillar, no preauricular, no posterior auricular and no occipital adenopathy present.        Head (left side): No submental, no submandibular, no tonsillar, no preauricular, no posterior auricular and no occipital adenopathy present.     He has no cervical adenopathy.        Left: No supraclavicular adenopathy present.   Neurological: He is alert. He is not disoriented. He displays no seizure activity. Coordination normal.   Skin: Skin is warm and dry. No lesion and no petechiae noted. He is not diaphoretic. No erythema.   Psychiatric: He has a normal mood and affect. Judgment normal. Cognition and memory are normal.         ED Course     ED Course   Value Comment Time   Ph Venous: 7.41 No acidosis 11/15 2340     Procedures       10:57 PM  The patient was seen and examined by Dr. Marie in Room 19.   Inferior Alveolar  Nerve Block - Dental Block  - Consent obtained from patient.   - Ensured appropriate procedure, site/side,medication, equipment and consent  - 0.5% bupivacaine used via standard approach for left-sided inferior alveolar nerve block  - Resulted in fairly good relief without any evidence for acute complication or issue.  Patient tolerated well.      Labs Ordered and Resulted from Time of ED Arrival Up to the Time of Departure from the ED - No data to display         Assessments & Plan (with Medical Decision Making)   IMPRESSION: 52-year-old male with PMH notable for HTN,presents for a 5 day course of worsening lower dental pain after a filling fell out of one of his molars and has pain and #18 and 19, but without any trouble tolerating his own secretions, breathing etc.he has no  Findings for formal fluctuance or anything that'll be amenable to drainage,, no abscesses.  For the mouth is soft, no tongue elevation, no asymmetry or abnormal fullness.  Uvula is midline and nonedematous.  PRETTY is normal without any findings for PTA, RPA, epiglottitis or Bhanu's angina.  The area in question does appear that it may have had a filling that has fallen out, with other dental caries, no acute findings for ANUG or other such abnormalities.     PLAN: Abx, pain control. Discussed the R/B/A and patient would like to move forward with inferior alveolar nerve block.  - Given the amount of ibuprofen the patient took I will also run this past the Poison Control team to ensure that there is nothing else we would need to do    RESULTS:  - Labs: Grossly WNL onset of mild hypokalemia.    INTERVENTIONS:   - Pen VK  - Dental block, inferior alveolar nerve block    RE-EVALUATION:  See ED Course section above for particular pertinent findings and comments  - Pt continues to do well here in the ED, no acute issues or apparent concerning changes in vitals or clinical appearance.   - The patient's symptoms were improved w/ the dental  chevy.    DISCUSSIONS:  - w/ Poison Control: given the timing since the patient took the medication and that he is otherwise relatively young and healthy, they think that we should just get a blood gas to ensure that is not acidemic and that he has no acute renal issues and if no issues on those labs, they think that he will likely do just fine and have no other recommendations or limitations. If those negative no further testing or observation needed.   - w/ Patient: I have reviewed the available findings, plan, need for follow up, and strict return instructions with the patient. He will call his dental team tomorrow morning to see if this can be moved up or present as a walk-in clinicto the walk-in clinic. He agrees to only use OTC medications as recommended (Advised of safety concerns/instructions for OTC medications, including Tylenol, Ibuprofen/Aleve, and Orajel. As well as to follow instructions on OTC packaging) He expressed understanding and agreement with this plan. All questions answered to the best of our ability at this time.     DISPOSITION/PLANNING:  - FINAL IMPRESSION: Dental pain  - DISPOSITION: D/C to home  --- Follow-up: w/ Dentist as soon as possible.   --- Recommendations: Conservative symptom management, strict return instructions  --- Rx: Penicillin VK      ______________________________________________________________________________    - I have reviewed the available nursing notes.    New Prescriptions    No medications on file       Final diagnoses:   None   IZari, am serving as a trained medical scribe to document services personally performed by Pita Marie MD, based on the provider's statements to me.   IPita MD, was physically present and have reviewed and verified the accuracy of this note documented by Zari Serrano.      11/15/2017   [facility], [place], EMERGENCY DEPARTMENT     Pita Marie MD  11/17/17 1457

## 2017-11-16 NOTE — ED NOTES
Pt presents to ED with left lower dental pain. Pt states he takes ibuprofen at home but it does not help.

## 2017-11-17 ASSESSMENT — ENCOUNTER SYMPTOMS
FACIAL SWELLING: 0
RHINORRHEA: 0

## 2017-12-13 ENCOUNTER — HOSPITAL ENCOUNTER (EMERGENCY)
Facility: CLINIC | Age: 52
Discharge: HOME | End: 2017-12-14
Attending: EMERGENCY MEDICINE | Admitting: EMERGENCY MEDICINE
Payer: COMMERCIAL

## 2017-12-13 DIAGNOSIS — M54.50 ACUTE BILATERAL LOW BACK PAIN WITHOUT SCIATICA: ICD-10-CM

## 2017-12-13 PROCEDURE — 99285 EMERGENCY DEPT VISIT HI MDM: CPT | Performed by: EMERGENCY MEDICINE

## 2017-12-13 PROCEDURE — 93010 ELECTROCARDIOGRAM REPORT: CPT | Mod: Z6 | Performed by: EMERGENCY MEDICINE

## 2017-12-13 PROCEDURE — 93005 ELECTROCARDIOGRAM TRACING: CPT | Performed by: EMERGENCY MEDICINE

## 2017-12-13 PROCEDURE — 99285 EMERGENCY DEPT VISIT HI MDM: CPT | Mod: 25 | Performed by: EMERGENCY MEDICINE

## 2017-12-13 NOTE — ED AVS SNAPSHOT
[facility], [place], Emergency Department    [address]    [facility] [address]    Phone:  [phone]    Fax:  [phone]                                       Augustin Hernandez   MRN: [id number]    Department:  [facility], Emergency Department   Date of Visit:  12/13/2017           After Visit Summary Signature Page     I have received my discharge instructions, and my questions have been answered. I have discussed any challenges I see with this plan with the nurse or doctor.    ..........................................................................................................................................  Patient/Patient Representative Signature      ..........................................................................................................................................  Patient Representative Print Name and Relationship to Patient    ..................................................               ................................................  Date                                            Time    ..........................................................................................................................................  Reviewed by Signature/Title    ...................................................              ..............................................  Date                                                            Time

## 2017-12-13 NOTE — ED AVS SNAPSHOT
[facility], Emergency Department    [address]    [facility] [place] [place] [address]    Phone:  [phone]    Fax:  [phone]                                       Augustin Hernandez   MRN: [id number]    Department:  [facility], Emergency Department   Date of Visit:  12/13/2017           Patient Information     Date Of Birth          1965        Your diagnoses for this visit were:     Acute bilateral low back pain without sciatica        You were seen by Geovanna Mccloud MD.        Discharge Instructions       Please make an appointment to [facility] (phone: [phone] as soon as possible to establish care and possible referral to physical therapy.    Take naproxen twice a day for the next 7 days.  You may use flexeril as needed for back pain but do not drive after taking this medication.      Your abdominal CT did show a cyst on your kidney which will need further follow-up.  This can be scheduled by primary care.      If you have worsening pain, numbness/weakness into your legs, loss of bowel/bladder control, fevers or other concerns, return to the emergency department for re-evaluation.            *BACK PAIN [acute or chronic]    Back pain is usually caused by an injury to the muscles or ligaments of the spine. Sometimes the disks that separate each bone in the spine may bulge and cause pain by pressing on a nearby nerve. Back pain may also appear after a sudden twisting/bending force (such as in a car accident), after a simple awkward movement, or lifting something heavy with poor body positioning. In either case, muscle spasm is often present and adds to the pain.  Acute back pain usually gets better in one to two weeks. Back pain related to disk disease, arthritis in the spinal joints or spinal stenosis (narrowing of the spinal canal) can become chronic and last for months or years.  Unless you had a physical injury (for example, a car accident or fall)  X-rays are usually not ordered for the initial evaluation of back pain. If pain continues and does not respond to medical treatment, x-rays and other tests may be performed at a later time.  HOME CARE:  1. You should rest as needed. But, as soon as possible, begin sitting or walking to avoid problems with prolonged bed rest (muscle weakness, worsening back stiffness and pain, blood clots in the legs).  2. When in bed, try to find a position of comfort. A firm mattress is best. Try lying flat on your back with pillows under your knees. You can also try lying on your side with your knees bent up towards your chest and a pillow between your knees.  3. Avoid prolonged sitting. This puts more stress on the lower back than standing or walking.  4. During the first two days after injury, apply an ICE PACK to the painful area for 20 minutes every 2-4 hours. This will reduce swelling and pain. HEAT (hot shower, hot bath or heating pad) works well for muscle spasm. You can start with ice, then switch to heat after two days. Some patients feel best alternating ice and heat treatments. Use the one method that feels the best to you.  5. You may use acetaminophen (Tylenol) 650-1000 mg every 6 hours or ibuprofen (Motrin, Advil) 600 mg every 6-8 hours with food to control pain, if you are able to take these medicines. [NOTE: If you have chronic liver or kidney disease or ever had a stomach ulcer or GI bleeding, talk with your doctor before using these medicines.]  6. Be aware of safe lifting methods and do not lift anything over 15 pounds until all the pain is gone.  FOLLOW UP with your doctor if your symptoms do not start to improve after one week. Your doctor may consider using physical therapy to help your recover.   GET PROMPT MEDICAL ATTENTION if any of the following occur:    Pain becomes worse or spreads to your legs    Weakness or numbness in one or both legs    Loss of bowel or bladder control    Numbness in the groin or  genital area    1269-7100 [employer], [address] [place] [address], [place] [address]. All rights reserved. This information is not intended as a substitute for professional medical care. Always follow your healthcare professional's instructions.          24 Hour Appointment Hotline       To make an appointment at any [facility], call [phone] ([phone]). If you don't have a family doctor or clinic, we will help you find one. [facility] clinics are conveniently located to serve the needs of you and your family.             Review of your medicines      START taking        Dose / Directions Last dose taken    cyclobenzaprine 5 MG tablet   Commonly known as:  FLEXERIL   Dose:  5 mg   Quantity:  10 tablet        Take 1 tablet (5 mg) by mouth 2 times daily as needed for muscle spasms   Refills:  0        naproxen 500 MG tablet   Commonly known as:  NAPROSYN   Dose:  500 mg   Quantity:  16 tablet        Take 1 tablet (500 mg) by mouth 2 times daily (with meals) for 8 days   Refills:  0          Our records show that you are taking the medicines listed below. If these are incorrect, please call your family doctor or clinic.        Dose / Directions Last dose taken    IBUPROFEN PO   Dose:  400 mg        Take 400 mg by mouth every 6 hours   Refills:  0        lisinopril-hydrochlorothiazide 20-25 MG per tablet   Commonly known as:  PRINZIDE/ZESTORETIC   Dose:  1 tablet   Quantity:  30 tablet        Take 1 tablet by mouth daily   Refills:  0        UNKNOWN TO PATIENT        Refills:  0                Prescriptions were sent or printed at these locations (2 Prescriptions)                   Other Prescriptions                Printed at Department/Unit printer (2 of 2)         cyclobenzaprine (FLEXERIL) 5 MG tablet               naproxen (NAPROSYN) 500 MG tablet                Procedures and tests performed during your visit     CBC with platelets differential    CRP inflammation    CT Abdomen Pelvis w  "Contrast    Comprehensive metabolic panel    EKG 12-lead, tracing only    Erythrocyte sedimentation rate auto    Lipase    Troponin I      Orders Needing Specimen Collection     None      Pending Results     No orders found for last 3 day(s).            Pending Culture Results     No orders found for last 3 day(s).            Pending Results Instructions     If you had any lab results that were not finalized at the time of your Discharge, you can call the ED Lab Result RN at [phone]. You will be contacted by this team for any positive Lab results or changes in treatment. The nurses are available 7 days a week from 10A to 6:30P.  You can leave a message 24 hours per day and they will return your call.        Thank you for choosing [facility]       Thank you for choosing [facility] for your care. Our goal is always to provide you with excellent care. Hearing back from our patients is one way we can continue to improve our services. Please take a few minutes to complete the written survey that you may receive in the mail after you visit with us. Thank you!        [employer] Information     [employer] lets you send messages to your doctor, view your test results, renew your prescriptions, schedule appointments and more. To sign up, go to [email or web address] . Click on \"Log in\" on the left side of the screen, which will take you to the Welcome page. Then click on \"Sign up Now\" on the right side of the page.     You will be asked to enter the access code listed below, as well as some personal information. Please follow the directions to create your username and password.     Your access code is: [email or web address]  Expires: 2017 12:13 AM     Your access code will  in 90 days. If you need help or a new code, please call your [facility] clinic or [phone].        Care EveryWhere ID     This is your Care EveryWhere ID. This could be used by other organizations to access your [facility] medical records  [id number]      "   Equal Access to Services     CK WISDOM : Rashawn Dawkins, anuradha gonzalez, magda richard. So [facility] [phone].    ATENCIÓN: Si habla español, tiene a oro disposición servicios gratuitos de asistencia lingüística. Llame al [phone].    We comply with applicable federal civil rights laws and [place] laws. We do not discriminate on the basis of race, color, national origin, age, disability, sex, sexual orientation, or gender identity.            After Visit Summary       This is your record. Keep this with you and show to your community pharmacist(s) and doctor(s) at your next visit.

## 2017-12-14 ENCOUNTER — APPOINTMENT (OUTPATIENT)
Dept: CT IMAGING | Facility: CLINIC | Age: 52
End: 2017-12-14
Attending: EMERGENCY MEDICINE
Payer: COMMERCIAL

## 2017-12-14 VITALS
HEART RATE: 77 BPM | DIASTOLIC BLOOD PRESSURE: 105 MMHG | SYSTOLIC BLOOD PRESSURE: 140 MMHG | RESPIRATION RATE: 18 BRPM | OXYGEN SATURATION: 100 % | TEMPERATURE: 96.6 F

## 2017-12-14 LAB
ALBUMIN SERPL-MCNC: 3.5 G/DL (ref 3.4–5)
ALP SERPL-CCNC: 61 U/L (ref 40–150)
ALT SERPL W P-5'-P-CCNC: 24 U/L (ref 0–70)
ANION GAP SERPL CALCULATED.3IONS-SCNC: 5 MMOL/L (ref 3–14)
AST SERPL W P-5'-P-CCNC: 16 U/L (ref 0–45)
BASOPHILS # BLD AUTO: 0 10E9/L (ref 0–0.2)
BASOPHILS NFR BLD AUTO: 0.3 %
BILIRUB SERPL-MCNC: 0.4 MG/DL (ref 0.2–1.3)
BUN SERPL-MCNC: 20 MG/DL (ref 7–30)
CALCIUM SERPL-MCNC: 8.7 MG/DL (ref 8.5–10.1)
CHLORIDE SERPL-SCNC: 106 MMOL/L (ref 94–109)
CO2 SERPL-SCNC: 29 MMOL/L (ref 20–32)
CREAT SERPL-MCNC: 1.02 MG/DL (ref 0.66–1.25)
CRP SERPL-MCNC: 5.3 MG/L (ref 0–8)
DIFFERENTIAL METHOD BLD: NORMAL
EOSINOPHIL # BLD AUTO: 0.2 10E9/L (ref 0–0.7)
EOSINOPHIL NFR BLD AUTO: 2 %
ERYTHROCYTE [DISTWIDTH] IN BLOOD BY AUTOMATED COUNT: 11.9 % (ref 10–15)
ERYTHROCYTE [SEDIMENTATION RATE] IN BLOOD BY WESTERGREN METHOD: 6 MM/H (ref 0–20)
GFR SERPL CREATININE-BSD FRML MDRD: 77 ML/MIN/1.7M2
GLUCOSE SERPL-MCNC: 99 MG/DL (ref 70–99)
HCT VFR BLD AUTO: 47.1 % (ref 40–53)
HGB BLD-MCNC: 17.2 G/DL (ref 13.3–17.7)
IMM GRANULOCYTES # BLD: 0 10E9/L (ref 0–0.4)
IMM GRANULOCYTES NFR BLD: 0.4 %
INTERPRETATION ECG - MUSE: NORMAL
LIPASE SERPL-CCNC: 179 U/L (ref 73–393)
LYMPHOCYTES # BLD AUTO: 2.3 10E9/L (ref 0.8–5.3)
LYMPHOCYTES NFR BLD AUTO: 22.1 %
MCH RBC QN AUTO: 32.1 PG (ref 26.5–33)
MCHC RBC AUTO-ENTMCNC: 36.5 G/DL (ref 31.5–36.5)
MCV RBC AUTO: 88 FL (ref 78–100)
MONOCYTES # BLD AUTO: 0.9 10E9/L (ref 0–1.3)
MONOCYTES NFR BLD AUTO: 9.1 %
NEUTROPHILS # BLD AUTO: 6.8 10E9/L (ref 1.6–8.3)
NEUTROPHILS NFR BLD AUTO: 66.1 %
NRBC # BLD AUTO: 0 10*3/UL
NRBC BLD AUTO-RTO: 0 /100
PLATELET # BLD AUTO: 188 10E9/L (ref 150–450)
POTASSIUM SERPL-SCNC: 3.4 MMOL/L (ref 3.4–5.3)
PROT SERPL-MCNC: 7.3 G/DL (ref 6.8–8.8)
RBC # BLD AUTO: 5.36 10E12/L (ref 4.4–5.9)
SODIUM SERPL-SCNC: 140 MMOL/L (ref 133–144)
TROPONIN I SERPL-MCNC: <0.015 UG/L (ref 0–0.04)
WBC # BLD AUTO: 10.3 10E9/L (ref 4–11)

## 2017-12-14 PROCEDURE — 25000128 H RX IP 250 OP 636: Performed by: EMERGENCY MEDICINE

## 2017-12-14 PROCEDURE — 85025 COMPLETE CBC W/AUTO DIFF WBC: CPT | Performed by: EMERGENCY MEDICINE

## 2017-12-14 PROCEDURE — 96361 HYDRATE IV INFUSION ADD-ON: CPT | Performed by: EMERGENCY MEDICINE

## 2017-12-14 PROCEDURE — 74177 CT ABD & PELVIS W/CONTRAST: CPT

## 2017-12-14 PROCEDURE — 84484 ASSAY OF TROPONIN QUANT: CPT | Performed by: EMERGENCY MEDICINE

## 2017-12-14 PROCEDURE — 85652 RBC SED RATE AUTOMATED: CPT | Performed by: EMERGENCY MEDICINE

## 2017-12-14 PROCEDURE — 96375 TX/PRO/DX INJ NEW DRUG ADDON: CPT | Performed by: EMERGENCY MEDICINE

## 2017-12-14 PROCEDURE — 96374 THER/PROPH/DIAG INJ IV PUSH: CPT | Mod: 59 | Performed by: EMERGENCY MEDICINE

## 2017-12-14 PROCEDURE — 83690 ASSAY OF LIPASE: CPT | Performed by: EMERGENCY MEDICINE

## 2017-12-14 PROCEDURE — 25000125 ZZHC RX 250: Performed by: EMERGENCY MEDICINE

## 2017-12-14 PROCEDURE — 80053 COMPREHEN METABOLIC PANEL: CPT | Performed by: EMERGENCY MEDICINE

## 2017-12-14 PROCEDURE — 86140 C-REACTIVE PROTEIN: CPT | Performed by: EMERGENCY MEDICINE

## 2017-12-14 PROCEDURE — 25000132 ZZH RX MED GY IP 250 OP 250 PS 637: Performed by: EMERGENCY MEDICINE

## 2017-12-14 RX ORDER — HYDROMORPHONE HYDROCHLORIDE 1 MG/ML
0.5 INJECTION, SOLUTION INTRAMUSCULAR; INTRAVENOUS; SUBCUTANEOUS
Status: COMPLETED | OUTPATIENT
Start: 2017-12-14 | End: 2017-12-14

## 2017-12-14 RX ORDER — IOPAMIDOL 755 MG/ML
100 INJECTION, SOLUTION INTRAVASCULAR ONCE
Status: COMPLETED | OUTPATIENT
Start: 2017-12-14 | End: 2017-12-14

## 2017-12-14 RX ORDER — DIAZEPAM 5 MG
5 TABLET ORAL ONCE
Status: COMPLETED | OUTPATIENT
Start: 2017-12-14 | End: 2017-12-14

## 2017-12-14 RX ORDER — NAPROXEN 500 MG/1
500 TABLET ORAL 2 TIMES DAILY WITH MEALS
Qty: 16 TABLET | Refills: 0 | Status: SHIPPED | OUTPATIENT
Start: 2017-12-14 | End: 2017-12-22

## 2017-12-14 RX ORDER — KETOROLAC TROMETHAMINE 15 MG/ML
15 INJECTION, SOLUTION INTRAMUSCULAR; INTRAVENOUS ONCE
Status: COMPLETED | OUTPATIENT
Start: 2017-12-14 | End: 2017-12-14

## 2017-12-14 RX ORDER — CYCLOBENZAPRINE HCL 5 MG
5 TABLET ORAL 2 TIMES DAILY PRN
Qty: 10 TABLET | Refills: 0 | Status: SHIPPED | OUTPATIENT
Start: 2017-12-14 | End: 2018-01-19

## 2017-12-14 RX ADMIN — SODIUM CHLORIDE 64 ML: 9 INJECTION, SOLUTION INTRAVENOUS at 02:04

## 2017-12-14 RX ADMIN — Medication 0.5 MG: at 00:57

## 2017-12-14 RX ADMIN — SODIUM CHLORIDE 1000 ML: 9 INJECTION, SOLUTION INTRAVENOUS at 00:57

## 2017-12-14 RX ADMIN — DIAZEPAM 5 MG: 5 TABLET ORAL at 03:17

## 2017-12-14 RX ADMIN — KETOROLAC TROMETHAMINE 15 MG: 15 INJECTION, SOLUTION INTRAMUSCULAR; INTRAVENOUS at 03:17

## 2017-12-14 RX ADMIN — IOPAMIDOL 94 ML: 755 INJECTION, SOLUTION INTRAVENOUS at 02:03

## 2017-12-14 ASSESSMENT — ENCOUNTER SYMPTOMS
DIARRHEA: 0
HEMATURIA: 0
ABDOMINAL PAIN: 1
VOMITING: 0
NUMBNESS: 0
BLOOD IN STOOL: 0
BACK PAIN: 1
FEVER: 0
WEAKNESS: 0
DYSURIA: 0
NAUSEA: 1
SHORTNESS OF BREATH: 0
FREQUENCY: 0

## 2017-12-14 NOTE — DISCHARGE INSTRUCTIONS
Please make an appointment to [facility] with [facility] (phone: [phone] as soon as possible to establish care and possible referral to physical therapy.    Take naproxen twice a day for the next 7 days.  You may use flexeril as needed for back pain but do not drive after taking this medication.      Your abdominal CT did show a cyst on your kidney which will need further follow-up.  This can be scheduled by primary care.      If you have worsening pain, numbness/weakness into your legs, loss of bowel/bladder control, fevers or other concerns, return to the emergency department for re-evaluation.            *BACK PAIN [acute or chronic]    Back pain is usually caused by an injury to the muscles or ligaments of the spine. Sometimes the disks that separate each bone in the spine may bulge and cause pain by pressing on a nearby nerve. Back pain may also appear after a sudden twisting/bending force (such as in a car accident), after a simple awkward movement, or lifting something heavy with poor body positioning. In either case, muscle spasm is often present and adds to the pain.  Acute back pain usually gets better in one to two weeks. Back pain related to disk disease, arthritis in the spinal joints or spinal stenosis (narrowing of the spinal canal) can become chronic and last for months or years.  Unless you had a physical injury (for example, a car accident or fall) X-rays are usually not ordered for the initial evaluation of back pain. If pain continues and does not respond to medical treatment, x-rays and other tests may be performed at a later time.  HOME CARE:  1. You should rest as needed. But, as soon as possible, begin sitting or walking to avoid problems with prolonged bed rest (muscle weakness, worsening back stiffness and pain, blood clots in the legs).  2. When in bed, try to find a position of comfort. A firm mattress is best. Try lying flat on your back with pillows under your knees. You  can also try lying on your side with your knees bent up towards your chest and a pillow between your knees.  3. Avoid prolonged sitting. This puts more stress on the lower back than standing or walking.  4. During the first two days after injury, apply an ICE PACK to the painful area for 20 minutes every 2-4 hours. This will reduce swelling and pain. HEAT (hot shower, hot bath or heating pad) works well for muscle spasm. You can start with ice, then switch to heat after two days. Some patients feel best alternating ice and heat treatments. Use the one method that feels the best to you.  5. You may use acetaminophen (Tylenol) 650-1000 mg every 6 hours or ibuprofen (Motrin, Advil) 600 mg every 6-8 hours with food to control pain, if you are able to take these medicines. [NOTE: If you have chronic liver or kidney disease or ever had a stomach ulcer or GI bleeding, talk with your doctor before using these medicines.]  6. Be aware of safe lifting methods and do not lift anything over 15 pounds until all the pain is gone.  FOLLOW UP with your doctor if your symptoms do not start to improve after one week. Your doctor may consider using physical therapy to help your recover.   GET PROMPT MEDICAL ATTENTION if any of the following occur:    Pain becomes worse or spreads to your legs    Weakness or numbness in one or both legs    Loss of bowel or bladder control    Numbness in the groin or genital area    2189-6266 [employer], [address] [place] [address], [place] [address]. All rights reserved. This information is not intended as a substitute for professional medical care. Always follow your healthcare professional's instructions.

## 2017-12-14 NOTE — ED PROVIDER NOTES
History   No chief complaint on file.    DEVYN Hernandez is a 52 year old male with a history of hypertension and tobacco dependence who presents with 3 days of back pain and now epigastric pain.  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  3 days ago he developed pain in his lower thoracic and upper lumbar back bilaterally. He feels this pain is radiating to his hips but does not extend into his legs. He denies any numbness, tingling or weakness into his lower extremities, bowel or bladder incontinence, dysuria, hematuria, increased frequency, diarrhea, melena, hematochezia or fevers. He denies any chiropractor manipulations, spinal injections or IV drug use.  He hasn t had any recent falls or trauma. He reports that the pain is in his back constantly and is present at rest, however is also worse with activity. He has been taking ibuprofen without any improvement. Yesterday he developed pain into his abdomen and epigastrium with associated nausea but denies vomiting. He describes this as a burning pain. He denies any chest pain or shortness of breath. He reports intermittently having some lower back pain in the past however nothing to this extent.      This part of the document was transcribed by Brandee Cox, Medical Scribe.     I have reviewed the Medications, Allergies, Past Medical and Surgical History, and Social History in the [facility] system.    Past Medical History:   Diagnosis Date     Hypertension        History reviewed. No pertinent surgical history.    No family history on file.    Social History   Substance Use Topics     Smoking status: Current Every Day Smoker     Packs/day: 0.50     Smokeless tobacco: Never Used     Alcohol use No       Current Facility-Administered Medications   Medication     0.9% sodium chloride BOLUS     HYDROmorphone (PF) (DILAUDID) injection 0.5 mg     Current Outpatient  Prescriptions   Medication     IBUPROFEN PO     lisinopril-hydrochlorothiazide (PRINZIDE/ZESTORETIC) 20-25 MG per tablet     UNKNOWN TO PATIENT        No Known Allergies     Review of Systems   Constitutional: Negative for fever.   Respiratory: Negative for shortness of breath.    Cardiovascular: Negative for chest pain.   Gastrointestinal: Positive for abdominal pain and nausea. Negative for blood in stool, diarrhea and vomiting.   Genitourinary: Negative for dysuria, frequency and hematuria.   Musculoskeletal: Positive for back pain.   Neurological: Negative for weakness and numbness.   All other systems reviewed and are negative.      Physical Exam   BP: (!) 166/108  Heart Rate: 77  Temp: 96.6  F (35.9  C)  Resp: 18  SpO2: 98 %      Physical Exam  General: patient is alert and oriented and in no acute distress   Head: atraumatic and normocephalic   EENT: moist mucus membranes without tonsillar erythema or exudates, pupils round and reactive   Neck: supple   Cardiovascular: regular rate and rhythm, extremities warm and well perfused, no lower extremity edema, 2+ DP and PT pulses bilaterally  Pulmonary: lungs clear to auscultation bilaterally   Abdomen: soft, mild epigastric TTP without guarding, no CVA TTP  Musculoskeletal: normal range of motion of extremities, no midline spinal TTP, TTP along the upper lumbar/lower thoracic paraspinal muscles bilaterally  Neurological: alert and oriented, moving all extremities symmetrically, strength 5/5 and symmetric in hip flexion/extension, knee flexion/extension and ankle plantar/dorsiflexion, sensation to light touch in distal lower extremities intact, normal gait, 1+ patellar reflexes bilaterally  Skin: warm, dry     ED Course     ED Course     Procedures             EKG Interpretation:      Interpreted by Geovanna Mccloud  Time reviewed: 0045  Symptoms at time of EKG: epigastric pain   Rhythm: normal sinus   Rate: normal  Axis: normal  Ectopy: none  Conduction:  normal  ST Segments/ T Waves: No ST-T wave changes  Q Waves: none  Comparison to prior: No old EKG available    Clinical Impression: normal EKG            Critical Care time:  none           Labs Ordered and Resulted from Time of ED Arrival Up to the Time of Departure from the ED - No data to display         Assessments & Plan (with Medical Decision Making)   52-year-old male with a history of hypertension and tobacco dependence who presents with back pain and epigastric pain. Differential diagnosis includes but is not limited to musculoskeletal strain, pancreatitis, biliary disease, AAA. He has no evidence of acute cord compression. My suspicion for epidural abscess or hematoma is quite low.  He does not have any skin changes to suggest zoster. He has not sustained any traumatic injury.  Suspect his pain is most likely MSK as he reports he feels his back is very tense and worse with rotational movements. Due to his job he is also sitting in a car for very prolonged periods of time which may be exacerbating his symptoms.  He does have abdominal tenderness to palpation on exam and given his age, history of hypertension and tobacco abuse, I have obtained a CT of abdomen to evaluate for any intra-abdominal pathology in addition to thoracic and lumbar vertebrae. This shows no clear pathology causing his pain.  He was noted have possible inflammation around the mesentery however inflammatory markers are negative and his history and exam are less consistent with vasculitis and not consistent with mesenteric ischemia. His labs including CBC, CMP and lipase are WNL.  I have very low suspicion for cardiac etiology and EKG was obtained which is WNL with a negative troponin.  He was given analgesics in the Emergency Department and on reevaluation is feeling improved.  Will plan to discharge with flexeril and instructions to follow-up with primary care for possible physical therapy referral.  In addition he was noted to have a  renal lesion which he was made aware of and instructed to follow-up for repeat imaging.  He was given close return instructions for the emergency department and voiced understanding.    This part of the document was transcribed by Brandee Cox Medical Scribe.     I have reviewed the nursing notes.    I have reviewed the findings, diagnosis, plan and need for follow up with the patient.    New Prescriptions    CYCLOBENZAPRINE (FLEXERIL) 5 MG TABLET    Take 1 tablet (5 mg) by mouth 2 times daily as needed for muscle spasms    NAPROXEN (NAPROSYN) 500 MG TABLET    Take 1 tablet (500 mg) by mouth 2 times daily (with meals) for 8 days       Final diagnoses:   Acute bilateral low back pain without sciatica       12/13/2017   [facility], [place], EMERGENCY DEPARTMENT     Geovanna Mccloud MD  12/14/17 5425

## 2018-01-19 ENCOUNTER — HOSPITAL ENCOUNTER (EMERGENCY)
Facility: CLINIC | Age: 53
Discharge: HOME | End: 2018-01-19
Attending: EMERGENCY MEDICINE | Admitting: EMERGENCY MEDICINE

## 2018-01-19 VITALS
TEMPERATURE: 98.3 F | BODY MASS INDEX: 27.68 KG/M2 | RESPIRATION RATE: 16 BRPM | HEIGHT: 70 IN | DIASTOLIC BLOOD PRESSURE: 122 MMHG | HEART RATE: 95 BPM | SYSTOLIC BLOOD PRESSURE: 178 MMHG | WEIGHT: 193.34 LBS | OXYGEN SATURATION: 98 %

## 2018-01-19 DIAGNOSIS — R10.13 ABDOMINAL PAIN, EPIGASTRIC: ICD-10-CM

## 2018-01-19 DIAGNOSIS — Z76.0 ENCOUNTER FOR MEDICATION REFILL: ICD-10-CM

## 2018-01-19 DIAGNOSIS — M54.50 ACUTE LEFT-SIDED LOW BACK PAIN WITHOUT SCIATICA: ICD-10-CM

## 2018-01-19 LAB
ALBUMIN UR-MCNC: 10 MG/DL
AMORPH CRY #/AREA URNS HPF: ABNORMAL /HPF
APPEARANCE UR: ABNORMAL
BILIRUB UR QL STRIP: NEGATIVE
COLOR UR AUTO: YELLOW
GLUCOSE UR STRIP-MCNC: NEGATIVE MG/DL
HGB UR QL STRIP: NEGATIVE
KETONES UR STRIP-MCNC: NEGATIVE MG/DL
LEUKOCYTE ESTERASE UR QL STRIP: ABNORMAL
MUCOUS THREADS #/AREA URNS LPF: PRESENT /LPF
NITRATE UR QL: NEGATIVE
PH UR STRIP: 6.5 PH (ref 5–7)
RBC #/AREA URNS AUTO: 2 /HPF (ref 0–2)
SOURCE: ABNORMAL
SP GR UR STRIP: 1.02 (ref 1–1.03)
SQUAMOUS #/AREA URNS AUTO: <1 /HPF (ref 0–1)
UROBILINOGEN UR STRIP-MCNC: 2 MG/DL (ref 0–2)
WBC #/AREA URNS AUTO: 3 /HPF (ref 0–2)

## 2018-01-19 PROCEDURE — 99283 EMERGENCY DEPT VISIT LOW MDM: CPT | Mod: Z6 | Performed by: EMERGENCY MEDICINE

## 2018-01-19 PROCEDURE — 99284 EMERGENCY DEPT VISIT MOD MDM: CPT | Mod: 25 | Performed by: EMERGENCY MEDICINE

## 2018-01-19 PROCEDURE — 81001 URINALYSIS AUTO W/SCOPE: CPT | Performed by: EMERGENCY MEDICINE

## 2018-01-19 PROCEDURE — 25000132 ZZH RX MED GY IP 250 OP 250 PS 637: Performed by: EMERGENCY MEDICINE

## 2018-01-19 RX ORDER — IBUPROFEN 600 MG/1
600 TABLET, FILM COATED ORAL EVERY 6 HOURS PRN
Qty: 60 TABLET | Refills: 0 | Status: SHIPPED | OUTPATIENT
Start: 2018-01-19 | End: 2019-01-18

## 2018-01-19 RX ORDER — OXYCODONE AND ACETAMINOPHEN 5; 325 MG/1; MG/1
1-2 TABLET ORAL EVERY 6 HOURS PRN
Qty: 20 TABLET | Refills: 0 | Status: SHIPPED | OUTPATIENT
Start: 2018-01-19 | End: 2019-01-18

## 2018-01-19 RX ORDER — OXYCODONE AND ACETAMINOPHEN 5; 325 MG/1; MG/1
2 TABLET ORAL ONCE
Status: COMPLETED | OUTPATIENT
Start: 2018-01-19 | End: 2018-01-19

## 2018-01-19 RX ORDER — LISINOPRIL 40 MG/1
40 TABLET ORAL DAILY
Qty: 30 TABLET | Refills: 0 | Status: SHIPPED | OUTPATIENT
Start: 2018-01-19 | End: 2019-01-14

## 2018-01-19 RX ORDER — CYCLOBENZAPRINE HCL 5 MG
10 TABLET ORAL 3 TIMES DAILY PRN
Qty: 30 TABLET | Refills: 0 | Status: SHIPPED | OUTPATIENT
Start: 2018-01-19 | End: 2019-01-18

## 2018-01-19 RX ADMIN — OXYCODONE HYDROCHLORIDE AND ACETAMINOPHEN 2 TABLET: 5; 325 TABLET ORAL at 18:06

## 2018-01-19 ASSESSMENT — ENCOUNTER SYMPTOMS
WEAKNESS: 0
NUMBNESS: 0
SORE THROAT: 0
FEVER: 1
RHINORRHEA: 0
DYSURIA: 1
COUGH: 0
BACK PAIN: 1
ABDOMINAL DISTENTION: 1
ABDOMINAL PAIN: 1

## 2018-01-19 NOTE — ED PROVIDER NOTES
History     Chief Complaint   Patient presents with     Back Pain     HPI  Augustin Hernandez is a 52 year old male with a history of hypertension who presents with back pain and epigastric pain. Of note, the patient was evaluated here in the ED for similar complaints about 1 month ago (12/13/17). At that time, he underwent a CT abdomen/pelvis and was discharged with a prescription for Flexeril. The patient reports that he has continued to have lower back pain since then. However, his lower back pain exacerbated again 3 days ago. He notes that he was lying down when he twisted to reach for something and developed sudden, sharp stabbing left lower back pain.  The pain is nonradiating.  He reports that the pain has been persistent over the past few days. The pain is exacerbated by driving for extended periods of time, which he is required to do for his job. He denies any recent falls, heavy lifting or trauma to his back. He has been taking ibuprofen and the muscle relaxer he was prescribed for this pain without relief. The patient also complains of epigastric abdominal pain and abdominal distention. He complains of subjective fevers and dysuria. He denies chest pain, cough, runny nose, sore throat, weakness or numbness of the lower extremities, loss of bowel or bladder control. He denies any history of back surgeries in the past. He reports a history of hernia repair surgery in 1996, otherwise denies any abdominal surgeries. Denies any history of kidney, liver or heart disease.  Patient denies any history of opiate abuse or dependence.    Of note, the patient reports that he has run out of his daily medications, including lisinopril and clonidine. Per Care Everywhere, he is prescribed 40 mg of lisinopril.     Results from CT abdomen/pelvis from ED visit on 12/13/17 are as follows:  IMPRESSION:  1. No convincing acute cause of pain identified  2. Slight indistinct haziness in the fat surrounding the SMA  "of  uncertain significance but suggest clinical correlation to exclude the  possibility of vasculitis.  3. Small nonobstructing renal stones.  4. A 1.4 cm left renal lesion is indeterminate. This may be a  hemorrhagic or complex cyst but a small solid lesion cannot be  excluded. If there are no old comparison studies, follow-up outpatient  renal MRI would be helpful to further characterize this  5. Colonic diverticulosis.    Past Medical History:   Diagnosis Date     Hypertension        Past Surgical History:   Procedure Laterality Date     HERNIA REPAIR         No family history on file.    Social History   Substance Use Topics     Smoking status: Current Every Day Smoker     Packs/day: 0.50     Smokeless tobacco: Never Used     Alcohol use No     No current facility-administered medications for this encounter.      Current Outpatient Prescriptions   Medication     cyclobenzaprine (FLEXERIL) 5 MG tablet     ibuprofen (ADVIL/MOTRIN) 600 MG tablet     ranitidine (ZANTAC) 150 MG tablet     oxyCODONE-acetaminophen (PERCOCET) 5-325 MG per tablet     lisinopril (PRINIVIL/ZESTRIL) 40 MG tablet     IBUPROFEN PO     lisinopril-hydrochlorothiazide (PRINZIDE/ZESTORETIC) 20-25 MG per tablet     UNKNOWN TO PATIENT      No Known Allergies     I have reviewed the Medications, Allergies, Past Medical and Surgical History, and Social History in the [facility] system.    Review of Systems   Constitutional: Positive for fever (subejctive).   HENT: Negative for rhinorrhea and sore throat.    Respiratory: Negative for cough.    Gastrointestinal: Positive for abdominal distention (bloating) and abdominal pain (epigastric).   Genitourinary: Positive for dysuria.   Musculoskeletal: Positive for back pain (left lower).   Neurological: Negative for weakness and numbness.   All other systems reviewed and are negative.      Physical Exam   BP: (!) 178/122  Pulse: 89  Temp: 98.3  F (36.8  C)  Resp: 16  Height: 177.8 cm (5' 10\")  Weight: 87.7 kg (193 " lb 5.5 oz)  SpO2: 98 %      Physical Exam  GEN:  Well developed, no acute distress  HEENT:  EOMI, Mucous membranes are moist.   Cardio:  RRR, no murmur, radial pulses equal bilaterally  PULM:  Lungs clear, good air movement, no wheezes, rales  Abd:  Soft, normal bowel sounds, no focal tenderness  Musculoskeletal: No C-spine, T-spine, L-spine tenderness, no soft tissue tenderness in the left lower back area.  No ecchymosis or erythema or rash on the back.  Normal range of motion of the extremities, no lower extremity swelling or calf tenderness  Neuro:  Alert and oriented X3, Follows commands, normal strength and sensation in the legs and feet bilaterally, normal patellar reflexes bilaterally, normal gait  Skin:  Warm, dry   ED Course     ED Course     Procedures     5:08 PM  The patient was seen and examined by Dr. Brantley in Room [facility].               Critical Care time:  none  The findings on the patient's CT of the abdomen and pelvis from last month were discussed with him and his girlfriend.  At that time, there was a left renal lesion of indeterminate significance.  MRI was recommended to evaluate this further.  I again advised the patient to have outpatient MRI.  This should be arranged through his primary care provider.  Labs are normal except as shown.  Urinalysis is not highly suggestive of acute infection.  I doubt acute ureteral stone since the pain is provoked with movement. The patient was given a dose of Percocet 5-325 mg for pain in the ED. He was discharged with a small prescription of Percocet as well as prescriptions for Flexeril, ibuprofen, Zantac and lisinopril.        Labs Ordered and Resulted from Time of ED Arrival Up to the Time of Departure from the ED   UA MACROSCOPIC WITH REFLEX TO MICRO AND CULTURE - Abnormal; Notable for the following:        Result Value    Protein Albumin Urine 10 (*)     Leukocyte Esterase Urine Trace (*)     WBC Urine 3 (*)     Mucous Urine Present (*)     Amorphous  Crystals Few (*)     All other components within normal limits            Assessments & Plan (with Medical Decision Making)   Patient presents with left lower back pain which is nonradiating.  He has no history of trauma and there are no neurologic deficits or symptoms.  I suspect this is muscular pain.  I advised that he may need to treat this with an anti-inflammatory medication as well as a muscle relaxant and pain medication.  He was advised to limit his use of opiate pain medication as much as possible.  He was given a prescription for Percocet with 20 tablets and no refills in addition to prescriptions for ibuprofen and Flexeril.  Patient's abdominal exam is completely benign, and previous workup for the same symptoms was negative.  This may be from gastritis.  He was given a prescription for Zantac and was advised to follow-up with his primary care provider for this.  Patient is hypertensive in the emergency department and states that he ran out of his blood pressure locations of couple weeks ago.  Per review of care everywhere records, I see that he has recently been taking lisinopril.  He was given a prescription for this and was advised to follow-up with his primary care provider for future refills and continued care.    I have reviewed the nursing notes.    I have reviewed the findings, diagnosis, plan and need for follow up with the patient.    Discharge Medication List as of 1/19/2018  6:49 PM      START taking these medications    Details   !! ibuprofen (ADVIL/MOTRIN) 600 MG tablet Take 1 tablet (600 mg) by mouth every 6 hours as needed for moderate pain, Disp-60 tablet, R-0, Local Print      ranitidine (ZANTAC) 150 MG tablet Take 1 tablet (150 mg) by mouth 2 times daily, Disp-60 tablet, R-0, Local Print      oxyCODONE-acetaminophen (PERCOCET) 5-325 MG per tablet Take 1-2 tablets by mouth every 6 hours as needed for moderate to severe pain, Disp-20 tablet, R-0, Local Print      lisinopril  (PRINIVIL/ZESTRIL) 40 MG tablet Take 1 tablet (40 mg) by mouth daily, Disp-30 tablet, R-0, Local Print       !! - Potential duplicate medications found. Please discuss with provider.          Final diagnoses:   Acute left-sided low back pain without sciatica   Encounter for medication refill   Abdominal pain, epigastric     IBoris, am serving as a trained medical scribe to document services personally performed by Kim Brantley MD, based on the provider's statements to me.   I, Kim Brantley MD, was physically present and have reviewed and verified the accuracy of this note documented by Boris Bender.     1/19/2018   [facility], [place], EMERGENCY DEPARTMENT     Kim Brantley MD  01/19/18 [id number]

## 2018-01-19 NOTE — ED AVS SNAPSHOT
[facility], [place], Emergency Department    [address] [place] [id number]    Phone:  [phone]                                       Augustin Hernandez   MRN: [id number]    Department:  [facility], Emergency Department   Date of Visit:  1/19/2018           After Visit Summary Signature Page     I have received my discharge instructions, and my questions have been answered. I have discussed any challenges I see with this plan with the nurse or doctor.    ..........................................................................................................................................  Patient/Patient Representative Signature      ..........................................................................................................................................  Patient Representative Print Name and Relationship to Patient    ..................................................               ................................................  Date                                            Time    ..........................................................................................................................................  Reviewed by Signature/Title    ...................................................              ..............................................  Date                                                            Time

## 2018-01-19 NOTE — ED AVS SNAPSHOT
[facility], Emergency Department    [address] [facility] [address]    Phone:  [phone]                                       Augustin Hernandez   MRN: [id number]    Department:  [facility], Emergency Department   Date of Visit:  1/19/2018           Patient Information     Date Of Birth          1965        Your diagnoses for this visit were:     Acute left-sided low back pain without sciatica     Encounter for medication refill     Abdominal pain, epigastric        You were seen by Kim Brantley MD.        Discharge Instructions         General Neck and Back Pain    Both neck and back pain are usually caused by injury to the muscles or ligaments of the spine. Sometimes the disks that separate each bone of the spine may cause pain by pressing on a nearby nerve. Back and neck pain may appear after a sudden twisting or bending force (such as in a car accident), or sometimes after a simple awkward movement. In either case, muscle spasm is often present and adds to the pain.  Acute neck and back pain usually gets better in 1 to 2 weeks. Pain related to disk disease, arthritis in the spinal joints or spinal stenosis (narrowing of the spinal canal) can become chronic and last for months or years.  Back and neck pain are common problems. Most people feel better in 1 or 2 weeks, and most of the rest in 1 to 2 months. Most people can remain active.  People experience and describe pain differently.    Pain can be sharp, stabbing, shooting, aching, cramping, or burning    Movement, standing, bending, lifting, sitting, or walking may worsen the pain    Pain can be localized to one spot or area, or it can be more generalized    Pain can spread or radiate upwards, downwards, to the front, or go down your arms    Muscle spasm may occur.  Most of the time mechanical problems with the muscles or spine cause the pain. it is usually caused by an injury, whether known or not, to the muscles  or ligaments. While illnesses can cause back pain, it is usually not caused by a serious illness. Pain is usually related to physical activity, whether sports, exercise, work, or normal activity. Sometimes it can occur without an identifiable cause. This can happen simply by stretching or moving wrong, without noting pain at the time. Other causes include:    Overexertion, lifting, pushing, pulling incorrectly or too aggressively.    Sudden twisting, bending or stretching from an accident (car or fall), or accidental movement.    Poor posture    Poor conditioning, lack of regular exercise    Spinal disc disease or arthritis    Stress    Pregnancy, or illness like appendicitis, bladder or kidney infection, pelvic infections   Home care    For neck pain: Use a comfortable pillow that supports the head and keeps the spine in a neutral position. The position of the head should not be tilted forward or backward.    When in bed, try to find a position of comfort. A firm mattress is best. Try lying flat on your back with pillows under your knees. You can also try lying on your side with your knees bent up towards your chest and a pillow between your knees.    At first, do not try to stretch out the sore spots. If there is a strain, it is not like the good soreness you get after exercising without an injury. In this case, stretching may make it worse.    Avoid prolonged sitting, long car rides or travel. This puts more stress on the lower back than standing or walking.    During the first 24 to 72 hours after an injury, apply an ice pack to the painful area for 20 minutes and then remove it for 20 minutes over a period of 60 to 90 minutes or several times a day.     You can alternate ice and heat therapies. Talk with your healthcare provider about the best treatment for your back or neck pain. As a safety precaution, do not use a heating pad at bedtime. Sleeping with a heating pad can lead to skin burns or tissue  damage.    Therapeutic massage can help relax the back and neck muscles without stretching them.    Be aware of safe lifting methods and do not lift anything over 15 pounds until all the pain is gone.  Medications  Talk to your healthcare provider before using medicine, especially if you have other medical problems or are taking other medicines.    You may use over-the-counter medicine to control pain, unless another pain medicine was prescribed. If you have chronic conditions like diabetes, liver or kidney disease, stomach ulcers,  gastrointestinal bleeding, or are taking blood thinner medicines.    Be careful if you are given pain medicines, narcotics, or medicine for muscle spasm. They can cause drowsiness, and can affect your coordination, reflexes, and judgment. Do not drive or operate heavy machinery.  Follow-up care  Follow up with your healthcare provider, or as advised. Physical therapy or further tests may be needed.  If X-rays were taken, you will be notified of any new findings that may affect your care.  Call 911  Seek emergency medical care if any of the following occur:    Trouble breathing    Confusion    Very drowsy or trouble awakening    Fainting or loss of consciousness    Rapid or very slow heart rate    Loss of bowel or bladder control  When to seek medical advice  Call your healthcare provider right away if any of these occur:    Pain becomes worse or spreads into your arms or legs    Weakness, numbness or pain in one or both arms or legs    Numbness in the groin area    Difficulty walking    Fever of 100.4 F (38 C) or higher, or as directed by your healthcare provider  Date Last Reviewed: 7/1/2016 2000-2017 [employer]. [address] [place] [address]. All rights reserved. This information is not intended as a substitute for professional medical care. Always follow your healthcare professional's instructions.    Please make an appointment to follow up with a primary  care provider as soon as possible for continued care.    Free or Reduced Cost Medical Services in [facility] (p) [phone]  [address] [facility] [address]    [facility] (p) [phone]  [address] [place] [address]    [facility] (p) [phone]  [address] [place] [address]    [facility] (p) [phone]  [address] [place] [address]    [facility] appointments line (p) [phone]  Various locations incl [facility], [facility], [facility]    [facility] (p) [phone] [address] [place] [address]    [facility] (p) [phone]  [address] [place] [address]    [facility] [place] (p) [phone]   provides chiropractic care, acupuncture, massage therapy, medical services, counseling and health coaching. It serves patients in the [facility] and surrounding communities. Services are free and open to the public.   [address] [place], [place], [address]    [facility] (p) [phone]  [address] [address]    [facility] (p) [phone]  [address] [address]    [facility] (p) [phone] [email or web address]  [address] [address]    For prescription programs, consider look at www.[email or web address] to see if any of your regular medications have discount programs.       Discharge References/Attachments     GASTRITIS OR ULCER (NO ANTIBIOTIC TREATMENT) (ENGLISH)      24 Hour Appointment Hotline       To make an appointment at any [facility], call [phone] ([phone]). If you don't have a family doctor or clinic, we will help you find one. [facility] are conveniently located  to serve the needs of you and your family.             Review of your medicines      START taking        Dose / Directions Last dose taken    lisinopril 40 MG tablet   Commonly known as:  PRINIVIL/ZESTRIL   Dose:  40 mg   Quantity:  30 tablet        Take 1 tablet (40 mg) by mouth daily   Refills:  0        oxyCODONE-acetaminophen 5-325 MG per tablet   Commonly known as:  PERCOCET   Dose:  1-2 tablet   Quantity:  20 tablet        Take 1-2 tablets by mouth every 6 hours as needed for moderate to severe pain   Refills:  0        ranitidine 150 MG tablet   Commonly known as:  ZANTAC   Dose:  150 mg   Quantity:  60 tablet        Take 1 tablet (150 mg) by mouth 2 times daily   Refills:  0          CONTINUE these medicines which may have CHANGED, or have new prescriptions. If we are uncertain of the size of tablets/capsules you have at home, strength may be listed as something that might have changed.        Dose / Directions Last dose taken    cyclobenzaprine 5 MG tablet   Commonly known as:  FLEXERIL   Dose:  10 mg   What changed:    - how much to take  - when to take this   Quantity:  30 tablet        Take 2 tablets (10 mg) by mouth 3 times daily as needed for muscle spasms   Refills:  0        * IBUPROFEN PO   Dose:  400 mg   What changed:  Another medication with the same name was added. Make sure you understand how and when to take each.        Take 400 mg by mouth every 6 hours   Refills:  0        * ibuprofen 600 MG tablet   Commonly known as:  ADVIL/MOTRIN   Dose:  600 mg   What changed:  You were already taking a medication with the same name, and this prescription was added. Make sure you understand how and when to take each.   Quantity:  60 tablet        Take 1 tablet (600 mg) by mouth every 6 hours as needed for moderate pain   Refills:  0        * Notice:  This list has 2 medication(s) that are the same as other medications prescribed for you. Read the directions carefully, and ask your doctor or other care  provider to review them with you.      Our records show that you are taking the medicines listed below. If these are incorrect, please call your family doctor or clinic.        Dose / Directions Last dose taken    lisinopril-hydrochlorothiazide 20-25 MG per tablet   Commonly known as:  PRINZIDE/ZESTORETIC   Dose:  1 tablet   Quantity:  30 tablet        Take 1 tablet by mouth daily   Refills:  0        UNKNOWN TO PATIENT        Refills:  0                Prescriptions were sent or printed at these locations (5 Prescriptions)                   Other Prescriptions                Printed at Department/Unit printer (5 of 5)         cyclobenzaprine (FLEXERIL) 5 MG tablet               ibuprofen (ADVIL/MOTRIN) 600 MG tablet               ranitidine (ZANTAC) 150 MG tablet               oxyCODONE-acetaminophen (PERCOCET) 5-325 MG per tablet               lisinopril (PRINIVIL/ZESTRIL) 40 MG tablet                Procedures and tests performed during your visit     UA reflex to Microscopic and Culture      Orders Needing Specimen Collection     None      Pending Results     No orders found from 1/17/2018 to 1/20/2018.            Pending Culture Results     No orders found from 1/17/2018 to 1/20/2018.            Pending Results Instructions     If you had any lab results that were not finalized at the time of your Discharge, you can call the ED Lab Result RN at [phone]. You will be contacted by this team for any positive Lab results or changes in treatment. The nurses are available 7 days a week from 10A to 6:30P.  You can leave a message 24 hours per day and they will return your call.        Thank you for choosing [facility]       Thank you for choosing [facility] for your care. Our goal is always to provide you with excellent care. Hearing back from our patients is one way we can continue to improve our services. Please take a few minutes to complete the written survey that you may receive in the mail after you visit with  "us. Thank you!        [employer] Information     [employer] lets you send messages to your doctor, view your test results, renew your prescriptions, schedule appointments and more. To sign up, go to [email or web address] . Click on \"Log in\" on the left side of the screen, which will take you to the Welcome page. Then click on \"Sign up Now\" on the right side of the page.     You will be asked to enter the access code listed below, as well as some personal information. Please follow the directions to create your username and password.     Your access code is: [email or web address]  Expires: 2018  6:49 PM     Your access code will  in 90 days. If you need help or a new code, please call your [facility] clinic or [phone].        Care EveryWhere ID     This is your Care EveryWhere ID. This could be used by other organizations to access your [facility] medical records  [id number]        Equal Access to Services     [facility] HospitalJOCELIN : Hadii aad ku hadasho Sostefanali, waaxda luqadaha, qaybta kaalmada adeegyagera, magda joseph . So [facility] [phone].    ATENCIÓN: Si habla español, tiene a oro disposición servicios gratuitos de asistencia lingüística. Llame al [phone].    We comply with applicable federal civil rights laws and [place] laws. We do not discriminate on the basis of race, color, national origin, age, disability, sex, sexual orientation, or gender identity.            After Visit Summary       This is your record. Keep this with you and show to your community pharmacist(s) and doctor(s) at your next visit.                  "

## 2018-01-19 NOTE — ED NOTES
Pt presents with c/o back pain x 1 week and headache d/t HTN. He has not taken his BP meds in over a week. He was seen here last week d/t back pain and given flexeril but pt states this does not help at all.

## 2018-01-20 NOTE — ED NOTES
Patient readied for D/c; given D/c info and prescription information.   arranged for f/u appointments.

## 2018-01-20 NOTE — DISCHARGE INSTRUCTIONS
General Neck and Back Pain    Both neck and back pain are usually caused by injury to the muscles or ligaments of the spine. Sometimes the disks that separate each bone of the spine may cause pain by pressing on a nearby nerve. Back and neck pain may appear after a sudden twisting or bending force (such as in a car accident), or sometimes after a simple awkward movement. In either case, muscle spasm is often present and adds to the pain.  Acute neck and back pain usually gets better in 1 to 2 weeks. Pain related to disk disease, arthritis in the spinal joints or spinal stenosis (narrowing of the spinal canal) can become chronic and last for months or years.  Back and neck pain are common problems. Most people feel better in 1 or 2 weeks, and most of the rest in 1 to 2 months. Most people can remain active.  People experience and describe pain differently.    Pain can be sharp, stabbing, shooting, aching, cramping, or burning    Movement, standing, bending, lifting, sitting, or walking may worsen the pain    Pain can be localized to one spot or area, or it can be more generalized    Pain can spread or radiate upwards, downwards, to the front, or go down your arms    Muscle spasm may occur.  Most of the time mechanical problems with the muscles or spine cause the pain. it is usually caused by an injury, whether known or not, to the muscles or ligaments. While illnesses can cause back pain, it is usually not caused by a serious illness. Pain is usually related to physical activity, whether sports, exercise, work, or normal activity. Sometimes it can occur without an identifiable cause. This can happen simply by stretching or moving wrong, without noting pain at the time. Other causes include:    Overexertion, lifting, pushing, pulling incorrectly or too aggressively.    Sudden twisting, bending or stretching from an accident (car or fall), or accidental movement.    Poor posture    Poor conditioning, lack of regular  exercise    Spinal disc disease or arthritis    Stress    Pregnancy, or illness like appendicitis, bladder or kidney infection, pelvic infections   Home care    For neck pain: Use a comfortable pillow that supports the head and keeps the spine in a neutral position. The position of the head should not be tilted forward or backward.    When in bed, try to find a position of comfort. A firm mattress is best. Try lying flat on your back with pillows under your knees. You can also try lying on your side with your knees bent up towards your chest and a pillow between your knees.    At first, do not try to stretch out the sore spots. If there is a strain, it is not like the good soreness you get after exercising without an injury. In this case, stretching may make it worse.    Avoid prolonged sitting, long car rides or travel. This puts more stress on the lower back than standing or walking.    During the first 24 to 72 hours after an injury, apply an ice pack to the painful area for 20 minutes and then remove it for 20 minutes over a period of 60 to 90 minutes or several times a day.     You can alternate ice and heat therapies. Talk with your healthcare provider about the best treatment for your back or neck pain. As a safety precaution, do not use a heating pad at bedtime. Sleeping with a heating pad can lead to skin burns or tissue damage.    Therapeutic massage can help relax the back and neck muscles without stretching them.    Be aware of safe lifting methods and do not lift anything over 15 pounds until all the pain is gone.  Medications  Talk to your healthcare provider before using medicine, especially if you have other medical problems or are taking other medicines.    You may use over-the-counter medicine to control pain, unless another pain medicine was prescribed. If you have chronic conditions like diabetes, liver or kidney disease, stomach ulcers,  gastrointestinal bleeding, or are taking blood thinner  medicines.    Be careful if you are given pain medicines, narcotics, or medicine for muscle spasm. They can cause drowsiness, and can affect your coordination, reflexes, and judgment. Do not drive or operate heavy machinery.  Follow-up care  Follow up with your healthcare provider, or as advised. Physical therapy or further tests may be needed.  If X-rays were taken, you will be notified of any new findings that may affect your care.  Call 911  Seek emergency medical care if any of the following occur:    Trouble breathing    Confusion    Very drowsy or trouble awakening    Fainting or loss of consciousness    Rapid or very slow heart rate    Loss of bowel or bladder control  When to seek medical advice  Call your healthcare provider right away if any of these occur:    Pain becomes worse or spreads into your arms or legs    Weakness, numbness or pain in one or both arms or legs    Numbness in the groin area    Difficulty walking    Fever of 100.4 F (38 C) or higher, or as directed by your healthcare provider  Date Last Reviewed: 7/1/2016 2000-2017 [employer]. [address] [place] [address]. All rights reserved. This information is not intended as a substitute for professional medical care. Always follow your healthcare professional's instructions.    Please make an appointment to follow up with a primary care provider as soon as possible for continued care.    Free or Reduced Cost Medical Services in [facility] (p) [phone]  [address], [place] [address]    [facility] (p) [phone]  [address] [place] [address]    [facility] (p) [phone]  [address] [place] [address]    [facility] (p) [phone] [address] [place] [address]    [facility] appointments line (p) [phone]  Various locations incl  [facility], [facility], [facility]    [facility] (p) [phone]  [address] [place] [address]    [facility] (p) [phone] [address] [place] [address]    [facility] (p) [phone]   provides chiropractic care, acupuncture, massage therapy, medical services, counseling and health coaching. It serves patients in the [facility] and surrounding communities. Services are free and open to the public.   [address] [place], [address]    [facility] (p) [phone]  [address] [address]    [facility] (p) [phone]  [address] [address]    [facility] (p) [phone] [email or web address]  [address] [address]    For prescription programs, consider look at www.[email or web address] to see if any of your regular medications have discount programs.

## 2018-01-20 NOTE — PROGRESS NOTES
Emergency Social Work Services Note    Date of  Intervention: 01/19/18  Last Emergency Department Visit:  12/13/17  Care Plan:  none  Collaborated with:  ED MD, ED, RN, chart review, registration staff, patient, patient's significant other    Data:  Mr. Augustin Hernandez is a 52 year old [place] male who presents to  d/t back pain and headache. ED SW consulted as Augustin reporting he does not have primary care.     Intervention:  ED SW met with Augustin along with his significant other. Augustin's insurance showing as 'inactive'. He had [employer] but hopes to switch to straight MA. Advised him to go to [facility] to get her insurance active again. Provided list of free/low cost medical clinics and prescription resources. He will be self-pay for rx's.     Assessment:  Augustin is uninsured and needs f/u care.    Plan:    Anticipated Disposition:  Home, no needs identified    Barriers to d/c plan:  Uninsured, will need to reapply for MA through [facility]    Follow Up:  None. ED SW provided Augustin with clinic and rx resources as below:    Free or Reduced Cost Medical Services in [facility] (p) [phone]  [address], [place] [address]    [facility] (p) [phone]  [address] [place] [address]    [facility] (p) [phone]  [address] [place] [address]    [facility] (p) [phone] [address] [place] [address]    [facility] appointments line (p) [phone]  Various locations incl [facility], [facility], [facility]    [facility] (p) [phone]  [address] [place] [address]    [facility] (p) [phone]  [facility] [address] [place] [address]    [facility] (p) [phone]    provides chiropractic care, acupuncture, massage therapy, medical services, counseling and health coaching. It serves patients in the [facility] and surrounding communities. Services are free and open to the public.   [address] [place], [place], [address]    [facility] (p) [phone]  [address] [address]    [facility] (p) [phone] [address] [address]    [facility] () [phone] [email or web address]  [address] [address]    [email or web address]    AMADOR Haile, [facility]  Social Work Services, Emergency Dept [facility]  Pager: [phone] Mon-Sat 9 am - 9 pm, on-call/after hours pager [phone]

## 2018-03-16 ENCOUNTER — HOSPITAL ENCOUNTER (EMERGENCY)
Facility: CLINIC | Age: 53
Discharge: HOME | End: 2018-03-17
Attending: EMERGENCY MEDICINE | Admitting: EMERGENCY MEDICINE

## 2018-03-16 DIAGNOSIS — R51.9 NONINTRACTABLE HEADACHE, UNSPECIFIED CHRONICITY PATTERN, UNSPECIFIED HEADACHE TYPE: ICD-10-CM

## 2018-03-16 DIAGNOSIS — I10 BENIGN ESSENTIAL HYPERTENSION: ICD-10-CM

## 2018-03-16 PROCEDURE — 99282 EMERGENCY DEPT VISIT SF MDM: CPT | Performed by: EMERGENCY MEDICINE

## 2018-03-16 PROCEDURE — 99284 EMERGENCY DEPT VISIT MOD MDM: CPT | Mod: Z6 | Performed by: EMERGENCY MEDICINE

## 2018-03-16 NOTE — ED AVS SNAPSHOT
[facility], Emergency Department    [address] [address]    Phone:  [phone]                                       Augustin Hernandez   MRN: [id number]    Department:  [facility], Emergency Department   Date of Visit:  3/16/2018           Patient Information     Date Of Birth          1965        Your diagnoses for this visit were:     Benign essential hypertension     Nonintractable headache, unspecified chronicity pattern, unspecified headache type        You were seen by Pita Marie MD.        Discharge Instructions       Follow up with a clinic doctor within 2 weeks. Return to the ER with new or worsening symptoms.     Discharge References/Attachments     HIGH BLOOD PRESSURE, CONTROLLING (ENGLISH)      24 Hour Appointment Hotline       To make an appointment at any [facility] clinic, call [phone]). If you don't have a family doctor or clinic, we will help you find one. [facility] are conveniently located to serve the needs of you and your family.             Review of your medicines      START taking        Dose / Directions Last dose taken    cloNIDine 0.1 MG tablet   Commonly known as:  CATAPRES   Dose:  0.1 mg   Quantity:  28 tablet        Take 1 tablet (0.1 mg) by mouth 2 times daily for 14 days   Refills:  0          CONTINUE these medicines which may have CHANGED, or have new prescriptions. If we are uncertain of the size of tablets/capsules you have at home, strength may be listed as something that might have changed.        Dose / Directions Last dose taken    * lisinopril 40 MG tablet   Commonly known as:  PRINIVIL/ZESTRIL   Dose:  40 mg   What changed:  Another medication with the same name was added. Make sure you understand how and when to take each.   Quantity:  30 tablet        Take 1 tablet (40 mg) by mouth daily   Refills:  0        * lisinopril 40 MG tablet   Commonly known as:  PRINIVIL/ZESTRIL   Dose:  40 mg   What  changed:  You were already taking a medication with the same name, and this prescription was added. Make sure you understand how and when to take each.   Quantity:  14 tablet        Take 1 tablet (40 mg) by mouth daily for 14 days   Refills:  0        * Notice:  This list has 2 medication(s) that are the same as other medications prescribed for you. Read the directions carefully, and ask your doctor or other care provider to review them with you.      Our records show that you are taking the medicines listed below. If these are incorrect, please call your family doctor or clinic.        Dose / Directions Last dose taken    cyclobenzaprine 5 MG tablet   Commonly known as:  FLEXERIL   Dose:  10 mg   Quantity:  30 tablet        Take 2 tablets (10 mg) by mouth 3 times daily as needed for muscle spasms   Refills:  0        * IBUPROFEN PO   Dose:  400 mg        Take 400 mg by mouth every 6 hours   Refills:  0        * ibuprofen 600 MG tablet   Commonly known as:  ADVIL/MOTRIN   Dose:  600 mg   Quantity:  60 tablet        Take 1 tablet (600 mg) by mouth every 6 hours as needed for moderate pain   Refills:  0        lisinopril-hydrochlorothiazide 20-25 MG per tablet   Commonly known as:  PRINZIDE/ZESTORETIC   Dose:  1 tablet   Quantity:  30 tablet        Take 1 tablet by mouth daily   Refills:  0        oxyCODONE-acetaminophen 5-325 MG per tablet   Commonly known as:  PERCOCET   Dose:  1-2 tablet   Quantity:  20 tablet        Take 1-2 tablets by mouth every 6 hours as needed for moderate to severe pain   Refills:  0        UNKNOWN TO PATIENT        Refills:  0        * Notice:  This list has 2 medication(s) that are the same as other medications prescribed for you. Read the directions carefully, and ask your doctor or other care provider to review them with you.            Prescriptions were sent or printed at these locations (2 Prescriptions)                   Other Prescriptions                Printed at Department/Unit  "printer (2 of 2)         lisinopril (PRINIVIL/ZESTRIL) 40 MG tablet               cloNIDine (CATAPRES) 0.1 MG tablet                Orders Needing Specimen Collection     None      Pending Results     No orders found for last 3 day(s).            Pending Culture Results     No orders found for last 3 day(s).            Pending Results Instructions     If you had any lab results that were not finalized at the time of your Discharge, you can call the ED Lab Result RN at [phone]. You will be contacted by this team for any positive Lab results or changes in treatment. The nurses are available 7 days a week from 10A to 6:30P.  You can leave a message 24 hours per day and they will return your call.        Thank you for choosing [facility]       Thank you for choosing [facility] for your care. Our goal is always to provide you with excellent care. Hearing back from our patients is one way we can continue to improve our services. Please take a few minutes to complete the written survey that you may receive in the mail after you visit with us. Thank you!        [facility] [employer] Information     [employer] lets you send messages to your doctor, view your test results, renew your prescriptions, schedule appointments and more. To sign up, go to [email or web address] . Click on \"Log in\" on the left side of the screen, which will take you to the Welcome page. Then click on \"Sign up Now\" on the right side of the page.     You will be asked to enter the access code listed below, as well as some personal information. Please follow the directions to create your username and password.     Your access code is: [email or web address]  Expires: 2018  7:49 PM     Your access code will  in 90 days. If you need help or a new code, please call your [facility] clinic or [phone].        Care EveryWhere ID     This is your Care EveryWhere ID. This could be used by other organizations to access your [facility] medical records  [id number]        Equal " Access to Services     [facility] : Rashawn Dawkins, anuradha gonzalez, qamagda brandon. So [facility] [phone].    ATENCIÓN: Si habla español, tiene a oor disposición servicios gratuitos de asistencia lingüística. Llame al [phone].    We comply with applicable federal civil rights laws and [place] laws. We do not discriminate on the basis of race, color, national origin, age, disability, sex, sexual orientation, or gender identity.            After Visit Summary       This is your record. Keep this with you and show to your community pharmacist(s) and doctor(s) at your next visit.

## 2018-03-16 NOTE — ED AVS SNAPSHOT
[facility], [place], Emergency Department    [address] [place] [address]    Phone:  [phone]                                       Augustin Hernandez   MRN: [id number]    Department:  [facility], Emergency Department   Date of Visit:  3/16/2018           After Visit Summary Signature Page     I have received my discharge instructions, and my questions have been answered. I have discussed any challenges I see with this plan with the nurse or doctor.    ..........................................................................................................................................  Patient/Patient Representative Signature      ..........................................................................................................................................  Patient Representative Print Name and Relationship to Patient    ..................................................               ................................................  Date                                            Time    ..........................................................................................................................................  Reviewed by Signature/Title    ...................................................              ..............................................  Date                                                            Time

## 2018-03-17 VITALS
SYSTOLIC BLOOD PRESSURE: 156 MMHG | HEIGHT: 70 IN | DIASTOLIC BLOOD PRESSURE: 104 MMHG | TEMPERATURE: 98.6 F | OXYGEN SATURATION: 97 % | RESPIRATION RATE: 16 BRPM | BODY MASS INDEX: 27.94 KG/M2 | WEIGHT: 195.2 LBS | HEART RATE: 85 BPM

## 2018-03-17 RX ORDER — CLONIDINE HYDROCHLORIDE 0.1 MG/1
0.1 TABLET ORAL 2 TIMES DAILY
Qty: 28 TABLET | Refills: 0 | Status: ON HOLD | OUTPATIENT
Start: 2018-03-17 | End: 2019-01-27

## 2018-03-17 RX ORDER — LISINOPRIL 40 MG/1
40 TABLET ORAL DAILY
Qty: 14 TABLET | Refills: 0 | Status: SHIPPED | OUTPATIENT
Start: 2018-03-17 | End: 2019-01-14

## 2018-03-17 ASSESSMENT — ENCOUNTER SYMPTOMS
NAUSEA: 0
HEADACHES: 1
VOMITING: 0
SHORTNESS OF BREATH: 0
SPEECH DIFFICULTY: 0
WEAKNESS: 0
NUMBNESS: 0

## 2018-03-17 NOTE — ED PROVIDER NOTES
"  History     Chief Complaint   Patient presents with     Hypertension     Medication Refill     HPI  Augustin Hernandez is a 52-year-old male presents to the Emergency Department today stating that he ran out of his blood pressure medications, which include clonidine, 0.1 mg, as well as lisinopril, 4 days ago.  He states that every time he runs out of his meds, he tends to wake up with a headache.  This been happening the last couple days.  He states the headache is fairly mild.  There is no blurred vision, slurred speech, nausea, vomiting, numbness, tingling, weakness.  He has no chest pain or shortness of breath.  No other complaints.  He states he just wanted to get his meds refilled.  He states that his insurance recently changed, they told him that they no longer take the insurance he currently has at [facility], which he been seeing in the past.     This part of the document was transcribed by Dianne Cordero Medical Scribe.      I have reviewed the Medications, Allergies, Past Medical and Surgical History, and Social History in the Epic system.    Review of Systems   Eyes: Negative for visual disturbance.   Respiratory: Negative for shortness of breath.    Cardiovascular: Negative for chest pain.   Gastrointestinal: Negative for nausea and vomiting.   Neurological: Positive for headaches. Negative for speech difficulty, weakness and numbness.   All other systems reviewed and are negative.      Physical Exam   BP: (!) 162/106  Pulse: 85  Temp: 98.6  F (37  C)  Resp: 16  Height: 177.8 cm (5' 10\")  Weight: 88.5 kg (195 lb 3.2 oz)  SpO2: 97 %      Physical Exam   Constitutional: He is oriented to person, place, and time. No distress.   HENT:   Head: Atraumatic.   Mouth/Throat: Oropharynx is clear and moist. No oropharyngeal exudate.   Eyes: Pupils are equal, round, and reactive to light. No scleral icterus.   Neck: Neck supple.   Cardiovascular: Normal heart sounds and intact distal pulses.  "   Pulmonary/Chest: Breath sounds normal. No respiratory distress.   Abdominal: Soft. There is no tenderness.   Musculoskeletal: He exhibits no edema or tenderness.   Neurological: He is alert and oriented to person, place, and time. He exhibits normal muscle tone.   Skin: Skin is warm. No rash noted. He is not diaphoretic.       ED Course     ED Course     Procedures             Critical Care time:  none             Labs Ordered and Resulted from Time of ED Arrival Up to the Time of Departure from the ED - No data to display         Assessments & Plan (with Medical Decision Making)   The patient's exam is unremarkable.  He has no worrisome signs or symptoms as far as his headache goes.  Headache at this point is mild.  I will go ahead and give him a refill for 2 weeks of his lisinopril as well as clonidine.  I told him that he needs to establish care with a provider in the next 2 weeks.  He verbalized understanding.  He is instructed to call his insurance company and get information on clinics that take his insurance and his area.  He verbalized understanding and is agreeable to plan.  He is instructed to return to the ER with any new or worsening symptoms.    This part of the document was transcribed by Josef Gross Scribe.      I have reviewed the nursing notes.    I have reviewed the findings, diagnosis, plan and need for follow up with the patient.    New Prescriptions    CLONIDINE (CATAPRES) 0.1 MG TABLET    Take 1 tablet (0.1 mg) by mouth 2 times daily for 14 days    LISINOPRIL (PRINIVIL/ZESTRIL) 40 MG TABLET    Take 1 tablet (40 mg) by mouth daily for 14 days       Final diagnoses:   Benign essential hypertension   Nonintractable headache, unspecified chronicity pattern, unspecified headache type       3/16/2018   [facility], EMERGENCY DEPARTMENT

## 2018-03-17 NOTE — ED NOTES
Pt arrived in triage with concerns for hypertension after not taking his BP meds for four days. Pt states he is out of his medications and needs a refill. Pt states he has had a minor headache because of his hypertension.

## 2018-06-16 ENCOUNTER — APPOINTMENT (OUTPATIENT)
Dept: GENERAL RADIOLOGY | Facility: CLINIC | Age: 53
End: 2018-06-16
Attending: PHYSICIAN ASSISTANT

## 2018-06-16 ENCOUNTER — HOSPITAL ENCOUNTER (EMERGENCY)
Facility: CLINIC | Age: 53
Discharge: HOME | End: 2018-06-16
Attending: PHYSICIAN ASSISTANT | Admitting: PHYSICIAN ASSISTANT

## 2018-06-16 VITALS
BODY MASS INDEX: 27.72 KG/M2 | DIASTOLIC BLOOD PRESSURE: 84 MMHG | RESPIRATION RATE: 18 BRPM | SYSTOLIC BLOOD PRESSURE: 117 MMHG | OXYGEN SATURATION: 99 % | WEIGHT: 193.6 LBS | HEIGHT: 70 IN | TEMPERATURE: 98.4 F

## 2018-06-16 DIAGNOSIS — M54.50 ACUTE RIGHT-SIDED LOW BACK PAIN WITHOUT SCIATICA: ICD-10-CM

## 2018-06-16 PROCEDURE — 96374 THER/PROPH/DIAG INJ IV PUSH: CPT

## 2018-06-16 PROCEDURE — 25000132 ZZH RX MED GY IP 250 OP 250 PS 637: Performed by: PHYSICIAN ASSISTANT

## 2018-06-16 PROCEDURE — 73562 X-RAY EXAM OF KNEE 3: CPT | Mod: RT

## 2018-06-16 PROCEDURE — 25000128 H RX IP 250 OP 636: Performed by: PHYSICIAN ASSISTANT

## 2018-06-16 PROCEDURE — 99284 EMERGENCY DEPT VISIT MOD MDM: CPT | Mod: 25

## 2018-06-16 RX ORDER — METHOCARBAMOL 750 MG/1
750-1500 TABLET, FILM COATED ORAL 3 TIMES DAILY PRN
Qty: 20 TABLET | Refills: 0 | Status: SHIPPED | OUTPATIENT
Start: 2018-06-16 | End: 2018-06-21

## 2018-06-16 RX ORDER — KETOROLAC TROMETHAMINE 15 MG/ML
15 INJECTION, SOLUTION INTRAMUSCULAR; INTRAVENOUS EVERY 6 HOURS PRN
Status: DISCONTINUED | OUTPATIENT
Start: 2018-06-16 | End: 2018-06-17 | Stop reason: HOSPADM

## 2018-06-16 RX ORDER — HYDROCODONE BITARTRATE AND ACETAMINOPHEN 5; 325 MG/1; MG/1
2 TABLET ORAL ONCE
Status: COMPLETED | OUTPATIENT
Start: 2018-06-16 | End: 2018-06-16

## 2018-06-16 RX ORDER — METHOCARBAMOL 750 MG/1
750 TABLET, FILM COATED ORAL ONCE
Status: COMPLETED | OUTPATIENT
Start: 2018-06-16 | End: 2018-06-16

## 2018-06-16 RX ORDER — HYDROCODONE BITARTRATE AND ACETAMINOPHEN 5; 325 MG/1; MG/1
1 TABLET ORAL EVERY 4 HOURS PRN
Qty: 6 TABLET | Refills: 0 | Status: SHIPPED | OUTPATIENT
Start: 2018-06-16 | End: 2019-01-18

## 2018-06-16 RX ADMIN — KETOROLAC TROMETHAMINE 15 MG: 15 INJECTION, SOLUTION INTRAMUSCULAR; INTRAVENOUS at 21:20

## 2018-06-16 RX ADMIN — METHOCARBAMOL 750 MG: 750 TABLET ORAL at 21:35

## 2018-06-16 RX ADMIN — HYDROCODONE BITARTRATE AND ACETAMINOPHEN 2 TABLET: 5; 325 TABLET ORAL at 21:20

## 2018-06-16 ASSESSMENT — ENCOUNTER SYMPTOMS
WEAKNESS: 0
BACK PAIN: 1
NUMBNESS: 0
MYALGIAS: 0
ARTHRALGIAS: 1

## 2018-06-16 NOTE — ED AVS SNAPSHOT
Emergency Department    [address] [address]    Phone:  [phone]    Fax:  [phone]                                       Augustin Hernandez   MRN: [id number]    Department:   Emergency Department   Date of Visit:  6/16/2018           After Visit Summary Signature Page     I have received my discharge instructions, and my questions have been answered. I have discussed any challenges I see with this plan with the nurse or doctor.    ..........................................................................................................................................  Patient/Patient Representative Signature      ..........................................................................................................................................  Patient Representative Print Name and Relationship to Patient    ..................................................               ................................................  Date                                            Time    ..........................................................................................................................................  Reviewed by Signature/Title    ...................................................              ..............................................  Date                                                            Time

## 2018-06-16 NOTE — ED AVS SNAPSHOT
Emergency Department    [address] [facility] [address]    Phone:  [phone]    Fax:  [phone]                                       Augustin Hernandez   MRN: [id number]    Department:   Emergency Department   Date of Visit:  6/16/2018           Patient Information     Date Of Birth          1965        Your diagnoses for this visit were:     Acute right-sided low back pain without sciatica        You were seen by Melissa Mahmood PA-C.      Follow-up Information     Follow up with Jeanna Carson MD In 3 days.    Specialty:  Internal Medicine    Why:  Recheck    Contact information:    [address]  [facility] [address]  [phone]          Follow up with  Emergency Department.    Specialty:  EMERGENCY MEDICINE    Why:  If symptoms worsen    Contact information:    [address] [facility] [id number] [phone]        Discharge Instructions       Discharge Instructions  Back Pain  You were seen today for back pain. Back pain can have many causes, but most will get better without surgery or other specific treatment. Sometimes there is a herniated ( slipped ) disc. We do not usually do MRI scans to look for these right away, since most herniated discs will get better on their own with time.  Today, we did not find any evidence that your back pain was caused by a serious condition. However, sometimes symptoms develop over time and cannot be found during an emergency visit, so it is very important that you follow up with your primary provider.  Generally, every Emergency Department visit should have a follow-up clinic visit with either a primary or a specialty clinic/provider. Please follow-up as instructed by your emergency provider today.    Return to the Emergency Department if:    You develop a fever with your back pain.     You have weakness or change in sensation in one or both legs.    You lose control of your bowels or  bladder, or cannot empty your bladder (cannot pee).    Your pain gets much worse.     Follow-up with your provider:    Unless your pain has completely gone away, please make an appointment with your provider within one week. Most of the routine care for back pain is available in a clinic and not the Emergency Department. You may need further management of your back pain, such as more pain medication, imaging such as an X-ray or MRI, or physical therapy.    What can I do to help myself?    Remain Active -- People are often afraid that they will hurt their back further or delay recovery by remaining active, but this is one of the best things you can do for your back. In fact, staying in bed for a long time to rest is not recommended. Studies have shown that people with low back pain recover faster when they remain active. Movement helps to bring blood flow to the muscles and relieve muscle spasms as well as preventing loss of muscle strength.    Heat -- Using a heating pad can help with low back pain during the first few weeks. Do not sleep with a heating pad, as you can be burned.     Pain medications - You may take a pain medication such as Tylenol  (acetaminophen), Advil , Motrin  (ibuprofen) or Aleve  (naproxen).  If you were given a prescription for medicine here today, be sure to read all of the information (including the package insert) that comes with your prescription.  This will include important information about the medicine, its side effects, and any warnings that you need to know about.  The pharmacist who fills the prescription can provide more information and answer questions you may have about the medicine.  If you have questions or concerns that the pharmacist cannot address, please call or return to the Emergency Department.   Remember that you can always come back to the Emergency Department if you are not able to see your regular provider in the amount of time listed above, if you get any new  symptoms, or if there is anything that worries you.      24 Hour Appointment Hotline       To make an appointment at any [facility], call [phone] ([phone]). If you don't have a family doctor or clinic, we will help you find one. [facility] clinics are conveniently located to serve the needs of you and your family.             Review of your medicines      START taking        Dose / Directions Last dose taken    HYDROcodone-acetaminophen 5-325 MG per tablet   Commonly known as:  NORCO   Dose:  1 tablet   Quantity:  6 tablet        Take 1 tablet by mouth every 4 hours as needed for pain   Refills:  0        methocarbamol 750 MG tablet   Commonly known as:  ROBAXIN   Dose:  750-1500 mg   Quantity:  20 tablet        Take 1-2 tablets (750-1,500 mg) by mouth 3 times daily as needed for muscle spasms   Refills:  0          Our records show that you are taking the medicines listed below. If these are incorrect, please call your family doctor or clinic.        Dose / Directions Last dose taken    cloNIDine 0.1 MG tablet   Commonly known as:  CATAPRES   Dose:  0.1 mg   Quantity:  28 tablet        Take 1 tablet (0.1 mg) by mouth 2 times daily for 14 days   Refills:  0        cyclobenzaprine 5 MG tablet   Commonly known as:  FLEXERIL   Dose:  10 mg   Quantity:  30 tablet        Take 2 tablets (10 mg) by mouth 3 times daily as needed for muscle spasms   Refills:  0        * IBUPROFEN PO   Dose:  400 mg        Take 400 mg by mouth every 6 hours   Refills:  0        * ibuprofen 600 MG tablet   Commonly known as:  ADVIL/MOTRIN   Dose:  600 mg   Quantity:  60 tablet        Take 1 tablet (600 mg) by mouth every 6 hours as needed for moderate pain   Refills:  0        lisinopril 40 MG tablet   Commonly known as:  PRINIVIL/ZESTRIL   Dose:  40 mg   Quantity:  30 tablet        Take 1 tablet (40 mg) by mouth daily   Refills:  0        lisinopril-hydrochlorothiazide 20-25 MG per tablet   Commonly known as:   PRINZIDE/ZESTORETIC   Dose:  1 tablet   Quantity:  30 tablet        Take 1 tablet by mouth daily   Refills:  0        oxyCODONE-acetaminophen 5-325 MG per tablet   Commonly known as:  PERCOCET   Dose:  1-2 tablet   Quantity:  20 tablet        Take 1-2 tablets by mouth every 6 hours as needed for moderate to severe pain   Refills:  0        UNKNOWN TO PATIENT        Refills:  0        * Notice:  This list has 2 medication(s) that are the same as other medications prescribed for you. Read the directions carefully, and ask your doctor or other care provider to review them with you.            Information about OPIOIDS     PRESCRIPTION OPIOIDS: WHAT YOU NEED TO KNOW   We gave you an opioid (narcotic) pain medicine. It is important to manage your pain, but opioids are not always the best choice. You should first try all the other options your care team gave you. Take this medicine for as short a time (and as few doses) as possible.     These medicines have risks:    DO NOT drive when on new or higher doses of pain medicine. These medicines can affect your alertness and reaction times, and you could be arrested for driving under the influence (DUI). If you need to use opioids long-term, talk to your care team about driving.    DO NOT operate heave machinery    DO NOT do any other dangerous activities while taking these medicines.     DO NOT drink any alcohol while taking these medicines.      If the opioid prescribed includes acetaminophen, DO NOT take with any other medicines that contain acetaminophen. Read all labels carefully. Look for the word  acetaminophen  or  Tylenol.  Ask your pharmacist if you have questions or are unsure.    You can get addicted to pain medicines, especially if you have a history of addiction (chemical, alcohol or substance dependence). Talk to your care team about ways to reduce this risk.    Store your pills in a secure place, locked if possible. We will not replace any lost or stolen  medicine. If you don t finish your medicine, please throw away (dispose) as directed by your pharmacist. The [place] [employer]ution [employer] has more information about safe disposal: https://www.pca.state.[place]/living-green/managing-unwanted-medications.     All opioids tend to cause constipation. Drink plenty of water and eat foods that have a lot of fiber, such as fruits, vegetables, prune juice, apple juice and high-fiber cereal. Take a laxative (Miralax, milk of magnesia, Colace, Senna) if you don t move your bowels at least every other day.         Prescriptions were sent or printed at these locations (2 Prescriptions)                   Other Prescriptions                Printed at Department/Unit printer (2 of 2)         HYDROcodone-acetaminophen (NORCO) 5-325 MG per tablet               methocarbamol (ROBAXIN) 750 MG tablet                Procedures and tests performed during your visit     XR Knee Right 3 Views      Orders Needing Specimen Collection     None      Pending Results     No orders found from 6/14/2018 to 6/17/2018.            Pending Culture Results     No orders found from 6/14/2018 to 6/17/2018.            Pending Results Instructions     If you had any lab results that were not finalized at the time of your Discharge, you can call the ED Lab Result RN at [phone]. You will be contacted by this team for any positive Lab results or changes in treatment. The nurses are available 7 days a week from 10A to 6:30P.  You can leave a message 24 hours per day and they will return your call.        Test Results From Your Hospital Stay        6/16/2018 10:05 PM      Narrative     RIGHT KNEE THREE VIEWS   6/16/2018 9:31 PM     HISTORY: Right knee pain after falling to the ground after back  strain.     COMPARISON: None.        Impression     IMPRESSION: Normal.    ABIOLA KINGSTON MD                Clinical Quality Measure: Blood Pressure Screening     Your blood pressure was checked while you were  "in the emergency department today. The last reading we obtained was  BP: 129/82 . Please read the guidelines below about what these numbers mean and what you should do about them.  If your systolic blood pressure (the top number) is less than 120 and your diastolic blood pressure (the bottom number) is less than 80, then your blood pressure is normal. There is nothing more that you need to do about it.  If your systolic blood pressure (the top number) is 120-139 or your diastolic blood pressure (the bottom number) is 80-89, your blood pressure may be higher than it should be. You should have your blood pressure rechecked within a year by a primary care provider.  If your systolic blood pressure (the top number) is 140 or greater or your diastolic blood pressure (the bottom number) is 90 or greater, you may have high blood pressure. High blood pressure is treatable, but if left untreated over time it can put you at risk for heart attack, stroke, or kidney failure. You should have your blood pressure rechecked by a primary care provider within the next 4 weeks.  If your provider in the emergency department today gave you specific instructions to follow-up with your doctor or provider even sooner than that, you should follow that instruction and not wait for up to 4 weeks for your follow-up visit.        Thank you for choosing [facility]       Thank you for choosing [facility] for your care. Our goal is always to provide you with excellent care. Hearing back from our patients is one way we can continue to improve our services. Please take a few minutes to complete the written survey that you may receive in the mail after you visit with us. Thank you!        [employer] Information     [employer] lets you send messages to your doctor, view your test results, renew your prescriptions, schedule appointments and more. To sign up, go to [email or web address] . Click on \"Log in\" on the left side of the screen, which will take you to " "the Welcome page. Then click on \"Sign up Now\" on the right side of the page.     You will be asked to enter the access code listed below, as well as some personal information. Please follow the directions to create your username and password.     Your access code is: [email or web address]  Expires: 2018 10:20 PM     Your access code will  in 90 days. If you need help or a new code, please call your [facility] clinic or [phone].        Care EveryWhere ID     This is your Care EveryWhere ID. This could be used by other organizations to access your [facility] medical records  [id number]        Equal Access to Services     TARIK WISDOM : Rashawn Dawkins, anuradha gonzalez, piter self, magda crockett. So [facility] [phone].    ATENCIÓN: Si habla español, tiene a oro disposición servicios gratuitos de asistencia lingüística. Llame al [phone].    We comply with applicable federal civil rights laws and [place] laws. We do not discriminate on the basis of race, color, national origin, age, disability, sex, sexual orientation, or gender identity.            After Visit Summary       This is your record. Keep this with you and show to your community pharmacist(s) and doctor(s) at your next visit.                  "

## 2018-06-17 NOTE — ED PROVIDER NOTES
"  History     Chief Complaint:  Back Pain    HPI   Augustin Hernandez is a 52 year old male who presents to the ED for evaluation of lower back pain. The patient reports he was bending over to  a 45-50lb brick to build a fence yesterday when he heard a loud \"crack.\" He subsequently fell down onto his knees. He notes some tightness last night, but denied any severe pain last night. This morning he awoke with constant localized stabbing pain to his right lower back. He also has knee pain from falling to his knees. He has been ambulatory. The patient notes he has been taking Ibuprofen and Flexeril; last doses was at 3:00PM and 2:00PM today respectfully. These medications have not alleviated his pain. No hx of HIV, TB, prolonged steroid use, IV drug use, malignancy, or immunocompromise. The patient denies any direct trauma to the back, fecal/urinary incontinence, groin numbness, saddle paresthesias, numbness/weakness/tingling in his legs, fever or any other acute symptoms.     Allergies:  No known drug allergies    Medications:    Flexeril   Lisinopril   Catapres  Prinzide  Ibuprofen     Past Medical History:    HTN    Past Surgical History:    Hernia repair    Family History:    History reviewed. No pertinent family history.     Social History:  Smoking status: Former smoker   Alcohol use: No  Presents to ED alone    Marital Status:   [2]     Review of Systems   Musculoskeletal: Positive for arthralgias and back pain. Negative for gait problem and myalgias.   Neurological: Negative for weakness and numbness.   All other systems reviewed and are negative.    Physical Exam     Patient Vitals for the past 24 hrs:   BP Temp Temp src Heart Rate Resp SpO2 Height Weight   06/16/18 2056 129/82 98.4  F (36.9  C) Oral 86 20 99 % 1.778 m (5' 10\") 87.8 kg (193 lb 9.6 oz)     Physical Exam  General: Laying in bed. Alert and oriented. Mildly uncomfortable appearing.   Head:  The scalp, face, and head appear " normal   Eyes:  Conjunctivae and sclerae are normal    ENT:    Moist mucous membranes.    The oropharynx is normal    Uvula is in the midline    Neck:  No lymphadenopathy  CV:  DP and PT pulses intact bilaterally. Radial pulse intact bilaterally.     Regular rate and rhythm     Normal S1/S2    No pathological murmur detected   Resp:  Lungs are clear to auscultation    Non-labored    No rales or wheezing   GI:  Abdomen is soft, non-distended. No abdominal tenderness.     No abdominal tenderness   MS:  No midline lumbar, thoracic, cervical spinal tenderness. No anterior or posterior rib tenderness. Right sided lumbar paraspinal muscular tenderness with spasm noted. Good strength with hip flexion/extension, knee flexion/extension, dorsi/plantar flexion and extension in bilateral lower extremities.   Skin:  No rash or acute skin lesions noted  Neuro: Patellar and achilles reflexes intact bilaterally. Sensation intact throughout bilateral lower extremities. Speech is normal and fluent.     Emergency Department Course     Imaging:  Radiographic findings were communicated with the patient who voiced understanding of the findings.    XR Knee Right 3 Views  IMPRESSION: Normal. As read by Radiology.     Interventions:  2120: Toradol 15mg IM  2120: Norco 5-325mg 2 tablets PO  2135: Robaxin 750mg PO     Emergency Department Course:  Past medical records, nursing notes, and vitals reviewed.  2103: I performed an exam of the patient and obtained history, as documented above.    The patient was sent for a right knee x-ray while in the emergency department, findings above.    2201: I rechecked the patient. Explained findings to patient.    Findings and plan explained to the Patient. Patient discharged home with instructions regarding supportive care, medications, and reasons to return. The importance of close follow-up was reviewed.     Impression & Plan      Medical Decision Making:  Anneliese is a 52 year old male who presents for  evaluation of right sided low back pain. Presents vitally stable and afebrile. He sustained this while lifting a heavy object last night. He subsequently fell to his knees and strained his right knee. Patient has been ambulatory. He denies any direct trauma to the back. He denies any chronic history of back pain. Patient has tried Ibuprofen and Flexeril without significant relief. Without history of direct trauma and no red flags symptoms, I do not believe x-ray imaging needs to performed at this time. There is no clinical evidence of cauda equina syndrome, discitis, spinal/epidural space hematoma or epidural abscess or other worrisome etiology. Patient is neurologically normal. No red flag symptoms to necessitate getting MRI imaging at this time. Patient was sent for right knee x-ray given his right knee pain and this was negative. Patient was given Toradol, Robaxin, and Norco with relief of symptoms. I think this patient is safe to be discharged home. He was prescribed Robaxin and asked to take Ibuprofen for pain. He was given a small prescription for Norco as well. Ice or heat to the back and stretching exercises. No heavy lifting, bending or twisting. He was asked to ice the areas of discomfort. An ACE wrap was placed on his right knee. He was asked to follow-up with his primary care doctor in 2-3 days for recheck. He was asked to return immediately for saddle paresthesias, weakness, numbness in his legs, urinary/fecal incontinence or retention, fever, or any other concerning symptoms. All questions were answered prior to discharge. The patient understands and agrees to this plan.      Diagnosis:    ICD-10-CM   1. Acute right-sided low back pain without sciatica M54.5     Disposition: Patient discharged to home     Discharge Medications:  New Prescriptions    HYDROCODONE-ACETAMINOPHEN (NORCO) 5-325 MG PER TABLET    Take 1 tablet by mouth every 4 hours as needed for pain    METHOCARBAMOL (ROBAXIN) 750 MG TABLET     Take 1-2 tablets (750-1,500 mg) by mouth 3 times daily as needed for muscle spasms     Nimo Adorno  6/16/2018    EMERGENCY DEPARTMENT    Scribe Disclosure:  I, Nimo Adorno, am serving as a scribe at 9:03 PM on 6/16/2018 to document services personally performed by Melissa Mahmood PA-C based on my observations and the provider's statements to me.          Melissa Mahmood PA-C  06/16/18 3532

## 2019-01-07 VITALS
OXYGEN SATURATION: 98 % | SYSTOLIC BLOOD PRESSURE: 179 MMHG | DIASTOLIC BLOOD PRESSURE: 116 MMHG | RESPIRATION RATE: 18 BRPM | HEART RATE: 82 BPM

## 2019-01-08 ENCOUNTER — HOSPITAL ENCOUNTER (EMERGENCY)
Facility: CLINIC | Age: 54
Discharge: HOME | End: 2019-01-08
Attending: EMERGENCY MEDICINE | Admitting: EMERGENCY MEDICINE

## 2019-01-08 ENCOUNTER — HOSPITAL ENCOUNTER (EMERGENCY)
Facility: CLINIC | Age: 54
Discharge: LEFT BEFORE DISCHARGE | End: 2019-01-08

## 2019-01-08 VITALS
BODY MASS INDEX: 27.35 KG/M2 | OXYGEN SATURATION: 99 % | SYSTOLIC BLOOD PRESSURE: 180 MMHG | TEMPERATURE: 99.8 F | DIASTOLIC BLOOD PRESSURE: 133 MMHG | HEIGHT: 70 IN | RESPIRATION RATE: 16 BRPM | HEART RATE: 73 BPM | WEIGHT: 191 LBS

## 2019-01-08 DIAGNOSIS — K08.89 PAIN, DENTAL: ICD-10-CM

## 2019-01-08 DIAGNOSIS — K04.7 PERIAPICAL ABSCESS: ICD-10-CM

## 2019-01-08 PROCEDURE — 99283 EMERGENCY DEPT VISIT LOW MDM: CPT

## 2019-01-08 PROCEDURE — 25000132 ZZH RX MED GY IP 250 OP 250 PS 637: Performed by: EMERGENCY MEDICINE

## 2019-01-08 RX ORDER — OXYCODONE HYDROCHLORIDE 5 MG/1
5 TABLET ORAL ONCE
Status: COMPLETED | OUTPATIENT
Start: 2019-01-08 | End: 2019-01-08

## 2019-01-08 RX ORDER — BUPIVACAINE HYDROCHLORIDE AND EPINEPHRINE 5; 5 MG/ML; UG/ML
1.8 INJECTION, SOLUTION PERINEURAL ONCE
Status: DISCONTINUED | OUTPATIENT
Start: 2019-01-08 | End: 2019-01-08 | Stop reason: HOSPADM

## 2019-01-08 RX ADMIN — OXYCODONE HYDROCHLORIDE 5 MG: 5 TABLET ORAL at 21:14

## 2019-01-08 ASSESSMENT — MIFFLIN-ST. JEOR: SCORE: 1727.62

## 2019-01-08 NOTE — ED AVS SNAPSHOT
Emergency Department  [address] [place] [address]  Phone:  [phone]  Fax:  [phone]                                    Augustin Hernandez   MRN: [id number]    Department:   Emergency Department   Date of Visit:  1/8/2019           After Visit Summary Signature Page    I have received my discharge instructions, and my questions have been answered. I have discussed any challenges I see with this plan with the nurse or doctor.    ..........................................................................................................................................  Patient/Patient Representative Signature      ..........................................................................................................................................  Patient Representative Print Name and Relationship to Patient    ..................................................               ................................................  Date                                   Time    ..........................................................................................................................................  Reviewed by Signature/Title    ...................................................              ..............................................  Date                                               Time          22EPIC Rev 08/18

## 2019-01-08 NOTE — LETTER
January 8, 2019      To Whom It May Concern:      Augustin Hernandez was seen in our Emergency Department today, 01/08/19.  I expect his condition to improve over the next 1-2 days.  He may return to work when improved.    Sincerely,        Adalberto YATES RN

## 2019-01-09 NOTE — ED PROVIDER NOTES
"  History     Chief Complaint:  Dental Pain       HPI   Augustin Hernandez is a 53 year old male who presents with worsening dental pain in the left lower jaw for the last two weeks. Patient still has his wisdom teeth and he believes this is the problem. He is unsure of whether or not there was a triggering event but he states one of the wisdom teeth is loose. He states he is not able to touch his face without pain. He has tried ibuprofen and aspirin for the pain which did not resolve his symptoms. He has been taking penicillin for the past 1.5 days. Patient denies fever.     Allergies:  No known drug allergies.    Medications:    Catapres  Prinzide      Past Medical History:    HTN    Past Surgical History:    Hernia repair    Family History:    History reviewed. No pertinent family history.    Social History:  Presents to the ED alone.  Tobacco Use: Current every day smoker (0.50 ppd)  Alcohol Use: No  PCP: [facility]     Review of Systems   All other systems reviewed and are negative.    Physical Exam   First Vitals:  BP: (!) 176/119  Pulse: 88  Temp: 99.8  F (37.7  C)  Resp: 14  Height: 177.8 cm (5' 10\")  Weight: 86.6 kg (191 lb)  SpO2: 99 %      Physical Exam   General:  Sitting on bed, comfortable appearing.   Head:  No obvious trauma to head  Ears, Nose:  External ears normal.  Nose normal.   Mouth: tender loose left posterior mandibular tooth, no associated swelling or drainage.  Soft floor of the mouth.  No trismus.    Eyes:  Conjunctivae clear.  Pupils round and symmetry.    Neck:  Normal range of motion. Neck supple and symmetric.  No cricothyroid tenderness.    Pulm/Chest:  No respiratory distress.  Breathing comfortably.    CV: Regular rate and rhythm.    MSK:  Normal range of motion.   Neuro:  Alert. Moving all extremities.    Skin:  Skin is warm and dry.        Emergency Department Course     Interventions:  2114: Oxycodone, 5 mg, oral     Emergency Department " Course:  The patient arrived in triage where vitals were measured and recorded.   The patient was then escorted back to the emergency department.   The patient's medical records were reviewed.  Nursing notes and vitals were reviewed.  2021: I performed an exam of the patient as documented above.  The above workup was undertaken.  2108: I rechecked the patient and discussed results.  Findings and plan explained to the Patient. Patient discharged home, status improved, with instructions regarding supportive care, medications, and reasons to return as well as the importance of close follow-up was reviewed. Patient was prescribed Augmentin.      Impression & Plan      Medical Decision Making:  The patient presents with a tooth ache.  There is no abscess detected around the tooth amenable to incision and drainage.  The differential diagnosis includes: cracked tooth syndrome, pulpitis, sub-apical abscess, amongst others.  There is no evidence of buccinator/canine space infections, significant facial swelling, or Bhanu's angina. There are no posterior pharyngeal space infections detected.  No indication for emergent CT scan.  Patient was offered dental block but unfortunately we did not have available syringes and therefore he declined.  He was given 1 dose of oxycodone for pain control.  Follow up with a dentist/endodontist in the coming days is indicated for further work up and treatment.    Patient was noted to be hypertensive.  Suspect this is likely secondary to patient's pain and also medical noncompliance.  He does not follow with the primary care doctor.  He has not been taking his medications as prescribed.  He was offered a BMP and to refill his medication.  He declines.  He will follow-up with his primary care doctor.  Strict return precautions were discussed.  He is otherwise asymptomatic this time is likely secondary to situational and pain.  Patient was discharged in stable improved  condition.    Diagnosis:    ICD-10-CM    1. Pain, dental K08.89    2. Periapical abscess K04.7        Disposition:  Discharged to home.     Discharge Medications:      Medication List      Started    amoxicillin-clavulanate 875-125 MG tablet  Commonly known as:  AUGMENTIN  1 tablet, Oral, 2 TIMES DAILY              Perla MAO, ann serving as a scribe on 1/8/2019 at 8:21 PM to personally document services performed by Dr. Small based on my observations and the provider's statements to me.    EMERGENCY DEPARTMENT       Elisa Small MD  01/09/19 0018

## 2019-01-09 NOTE — DISCHARGE INSTRUCTIONS
Please follow up with a dentist as soon as possible.  Return to the ED if you notice fevers, increasing swelling or pain, difficulty swallowing or drooling, voice changes or other acute concerns.  May use tylenol and ibuprofen for pain control.  Please complete course of antibiotics for dental infection.      Discharge Instructions  Dental Pain    You have been seen today for a toothache. Your pain may be caused by an exposed nerve, an infection (pulpitis), a root abscess (pocket of pus), or other problems. You will need to see a dentist for a solution to your tooth problem. Emergency Department care is only to help control your problem until you can see a dentist; we cannot provide complete dental care.  Today, we did not find any sign that your toothache was caused by any dangerous or life-threatening condition, but sometimes symptoms develop over time and cannot be found during an emergency visit, so it is very important that you follow up with your dentist.      Generally, every Emergency Department visit should have a follow-up clinic visit with either a primary or a specialty clinic/provider. Please follow-up as instructed by your emergency provider today.    Return to the Emergency Department if:  You develop a new fever over 100.4 F.  You cannot open your mouth normally, cannot move your tongue well, or cannot swallow.  You have new or increased swelling of your face or neck.  You develop drainage of pus or foul smelling material from around your tooth.  What can I do to help myself?  Take any antibiotic the provider may have prescribed for you today.  Avoid very hot or very cold foods as both can cause pain.  Make an appointment to see a dentist as soon as possible. Dentists are generally not ?on-staff? at hospitals so we cannot ?refer? to you to dentist but we may be able to provide a list of dental clinics to help you.  If you were given a prescription for medicine here today, be sure to read all of the  information (including the package insert) that comes with your prescription.  This will include important information about the medicine, its side effects, and any warnings that you need to know about.  The pharmacist who fills the prescription can provide more information and answer questions you may have about the medicine.  If you have questions or concerns that the pharmacist cannot address, please call or return to the Emergency Department.   Remember that you can always come back to the Emergency Department if you are not able to see your regular provider in the amount of time listed above, if you get any new symptoms, or if there is anything that worries you.

## 2019-01-14 ENCOUNTER — HOSPITAL ENCOUNTER (EMERGENCY)
Facility: CLINIC | Age: 54
Discharge: HOME | End: 2019-01-14
Attending: EMERGENCY MEDICINE | Admitting: EMERGENCY MEDICINE

## 2019-01-14 ENCOUNTER — APPOINTMENT (OUTPATIENT)
Dept: CT IMAGING | Facility: CLINIC | Age: 54
End: 2019-01-14

## 2019-01-14 VITALS
BODY MASS INDEX: 27.77 KG/M2 | HEIGHT: 70 IN | DIASTOLIC BLOOD PRESSURE: 107 MMHG | TEMPERATURE: 98 F | SYSTOLIC BLOOD PRESSURE: 163 MMHG | RESPIRATION RATE: 14 BRPM | WEIGHT: 194 LBS | HEART RATE: 68 BPM | OXYGEN SATURATION: 97 %

## 2019-01-14 DIAGNOSIS — R51.9 ACUTE NONINTRACTABLE HEADACHE, UNSPECIFIED HEADACHE TYPE: ICD-10-CM

## 2019-01-14 DIAGNOSIS — I10 HYPERTENSION, UNSPECIFIED TYPE: ICD-10-CM

## 2019-01-14 LAB
ANION GAP SERPL CALCULATED.3IONS-SCNC: 7 MMOL/L (ref 3–14)
BASOPHILS # BLD AUTO: 0 10E9/L (ref 0–0.2)
BASOPHILS NFR BLD AUTO: 0.2 %
BUN SERPL-MCNC: 27 MG/DL (ref 7–30)
CALCIUM SERPL-MCNC: 8.5 MG/DL (ref 8.5–10.1)
CHLORIDE SERPL-SCNC: 105 MMOL/L (ref 94–109)
CO2 SERPL-SCNC: 31 MMOL/L (ref 20–32)
CREAT SERPL-MCNC: 1.22 MG/DL (ref 0.66–1.25)
DIFFERENTIAL METHOD BLD: ABNORMAL
EOSINOPHIL # BLD AUTO: 0.1 10E9/L (ref 0–0.7)
EOSINOPHIL NFR BLD AUTO: 1 %
ERYTHROCYTE [DISTWIDTH] IN BLOOD BY AUTOMATED COUNT: 12.1 % (ref 10–15)
ERYTHROCYTE [SEDIMENTATION RATE] IN BLOOD BY WESTERGREN METHOD: 6 MM/H (ref 0–20)
GFR SERPL CREATININE-BSD FRML MDRD: 68 ML/MIN/{1.73_M2}
GLUCOSE SERPL-MCNC: 90 MG/DL (ref 70–99)
HCT VFR BLD AUTO: 47.5 % (ref 40–53)
HGB BLD-MCNC: 16.8 G/DL (ref 13.3–17.7)
IMM GRANULOCYTES # BLD: 0 10E9/L (ref 0–0.4)
IMM GRANULOCYTES NFR BLD: 0.2 %
INTERPRETATION ECG - MUSE: NORMAL
LYMPHOCYTES # BLD AUTO: 3.5 10E9/L (ref 0.8–5.3)
LYMPHOCYTES NFR BLD AUTO: 30.9 %
MCH RBC QN AUTO: 31.2 PG (ref 26.5–33)
MCHC RBC AUTO-ENTMCNC: 35.4 G/DL (ref 31.5–36.5)
MCV RBC AUTO: 88 FL (ref 78–100)
MONOCYTES # BLD AUTO: 0.9 10E9/L (ref 0–1.3)
MONOCYTES NFR BLD AUTO: 7.7 %
NEUTROPHILS # BLD AUTO: 6.9 10E9/L (ref 1.6–8.3)
NEUTROPHILS NFR BLD AUTO: 60 %
PLATELET # BLD AUTO: 222 10E9/L (ref 150–450)
POTASSIUM SERPL-SCNC: 3.7 MMOL/L (ref 3.4–5.3)
RBC # BLD AUTO: 5.39 10E12/L (ref 4.4–5.9)
SODIUM SERPL-SCNC: 143 MMOL/L (ref 133–144)
TROPONIN I SERPL-MCNC: <0.015 UG/L (ref 0–0.04)
WBC # BLD AUTO: 11.4 10E9/L (ref 4–11)

## 2019-01-14 PROCEDURE — 25000125 ZZHC RX 250: Performed by: EMERGENCY MEDICINE

## 2019-01-14 PROCEDURE — 96374 THER/PROPH/DIAG INJ IV PUSH: CPT

## 2019-01-14 PROCEDURE — 70498 CT ANGIOGRAPHY NECK: CPT

## 2019-01-14 PROCEDURE — 96376 TX/PRO/DX INJ SAME DRUG ADON: CPT

## 2019-01-14 PROCEDURE — 80048 BASIC METABOLIC PNL TOTAL CA: CPT | Performed by: EMERGENCY MEDICINE

## 2019-01-14 PROCEDURE — 99285 EMERGENCY DEPT VISIT HI MDM: CPT | Mod: 25

## 2019-01-14 PROCEDURE — 85652 RBC SED RATE AUTOMATED: CPT | Performed by: EMERGENCY MEDICINE

## 2019-01-14 PROCEDURE — 93005 ELECTROCARDIOGRAM TRACING: CPT

## 2019-01-14 PROCEDURE — 96375 TX/PRO/DX INJ NEW DRUG ADDON: CPT

## 2019-01-14 PROCEDURE — 25000128 H RX IP 250 OP 636: Performed by: EMERGENCY MEDICINE

## 2019-01-14 PROCEDURE — 25000132 ZZH RX MED GY IP 250 OP 250 PS 637: Performed by: EMERGENCY MEDICINE

## 2019-01-14 PROCEDURE — 84484 ASSAY OF TROPONIN QUANT: CPT | Performed by: EMERGENCY MEDICINE

## 2019-01-14 PROCEDURE — 85025 COMPLETE CBC W/AUTO DIFF WBC: CPT | Performed by: EMERGENCY MEDICINE

## 2019-01-14 PROCEDURE — 70450 CT HEAD/BRAIN W/O DYE: CPT

## 2019-01-14 RX ORDER — TETRACAINE HYDROCHLORIDE 5 MG/ML
1-2 SOLUTION OPHTHALMIC ONCE
Status: DISCONTINUED | OUTPATIENT
Start: 2019-01-14 | End: 2019-01-14

## 2019-01-14 RX ORDER — LISINOPRIL AND HYDROCHLOROTHIAZIDE 20; 25 MG/1; MG/1
1 TABLET ORAL DAILY
Qty: 30 TABLET | Refills: 0 | Status: ON HOLD | OUTPATIENT
Start: 2019-01-14 | End: 2019-01-27

## 2019-01-14 RX ORDER — CLONIDINE HYDROCHLORIDE 0.1 MG/1
0.1 TABLET ORAL ONCE
Status: COMPLETED | OUTPATIENT
Start: 2019-01-14 | End: 2019-01-14

## 2019-01-14 RX ORDER — PROPARACAINE HYDROCHLORIDE 5 MG/ML
1 SOLUTION/ DROPS OPHTHALMIC ONCE
Status: COMPLETED | OUTPATIENT
Start: 2019-01-14 | End: 2019-01-14

## 2019-01-14 RX ORDER — LABETALOL HYDROCHLORIDE 5 MG/ML
20 INJECTION, SOLUTION INTRAVENOUS ONCE
Status: COMPLETED | OUTPATIENT
Start: 2019-01-14 | End: 2019-01-14

## 2019-01-14 RX ORDER — HYDRALAZINE HYDROCHLORIDE 20 MG/ML
10 INJECTION INTRAMUSCULAR; INTRAVENOUS ONCE
Status: DISCONTINUED | OUTPATIENT
Start: 2019-01-14 | End: 2019-01-14

## 2019-01-14 RX ORDER — FENTANYL CITRATE 50 UG/ML
50 INJECTION, SOLUTION INTRAMUSCULAR; INTRAVENOUS ONCE
Status: COMPLETED | OUTPATIENT
Start: 2019-01-14 | End: 2019-01-14

## 2019-01-14 RX ORDER — IOPAMIDOL 755 MG/ML
70 INJECTION, SOLUTION INTRAVASCULAR ONCE
Status: COMPLETED | OUTPATIENT
Start: 2019-01-14 | End: 2019-01-14

## 2019-01-14 RX ORDER — HYDROMORPHONE HYDROCHLORIDE 1 MG/ML
0.5 INJECTION, SOLUTION INTRAMUSCULAR; INTRAVENOUS; SUBCUTANEOUS ONCE
Status: COMPLETED | OUTPATIENT
Start: 2019-01-14 | End: 2019-01-14

## 2019-01-14 RX ORDER — METOCLOPRAMIDE HYDROCHLORIDE 5 MG/ML
10 INJECTION INTRAMUSCULAR; INTRAVENOUS ONCE
Status: COMPLETED | OUTPATIENT
Start: 2019-01-14 | End: 2019-01-14

## 2019-01-14 RX ORDER — LISINOPRIL 10 MG/1
20 TABLET ORAL ONCE
Status: COMPLETED | OUTPATIENT
Start: 2019-01-14 | End: 2019-01-14

## 2019-01-14 RX ADMIN — LISINOPRIL 20 MG: 10 TABLET ORAL at 22:20

## 2019-01-14 RX ADMIN — FENTANYL CITRATE 50 MCG: 50 INJECTION, SOLUTION INTRAMUSCULAR; INTRAVENOUS at 21:13

## 2019-01-14 RX ADMIN — LABETALOL HYDROCHLORIDE 20 MG: 5 INJECTION INTRAVENOUS at 20:55

## 2019-01-14 RX ADMIN — PROPARACAINE HYDROCHLORIDE 1 DROP: 5 SOLUTION/ DROPS OPHTHALMIC at 22:22

## 2019-01-14 RX ADMIN — IOPAMIDOL 70 ML: 755 INJECTION, SOLUTION INTRAVENOUS at 21:32

## 2019-01-14 RX ADMIN — LABETALOL HYDROCHLORIDE 20 MG: 5 INJECTION INTRAVENOUS at 21:51

## 2019-01-14 RX ADMIN — HYDROMORPHONE HYDROCHLORIDE 0.5 MG: 1 INJECTION, SOLUTION INTRAMUSCULAR; INTRAVENOUS; SUBCUTANEOUS at 21:50

## 2019-01-14 RX ADMIN — SODIUM CHLORIDE 90 ML: 9 INJECTION, SOLUTION INTRAVENOUS at 21:32

## 2019-01-14 RX ADMIN — CLONIDINE HYDROCHLORIDE 0.1 MG: 0.1 TABLET ORAL at 22:20

## 2019-01-14 RX ADMIN — METOCLOPRAMIDE 10 MG: 5 INJECTION, SOLUTION INTRAMUSCULAR; INTRAVENOUS at 20:56

## 2019-01-14 ASSESSMENT — MIFFLIN-ST. JEOR: SCORE: 1741.23

## 2019-01-14 ASSESSMENT — ENCOUNTER SYMPTOMS
HEADACHES: 1
VOMITING: 0
MYALGIAS: 0
SORE THROAT: 1
NAUSEA: 0
EYE PAIN: 1
SHORTNESS OF BREATH: 0

## 2019-01-14 NOTE — ED AVS SNAPSHOT
Emergency Department  [address] [facility] [address]  Phone:  [phone]  Fax:  [phone]                                    Augustin Hernandez   MRN: [id number]    Department:   Emergency Department   Date of Visit:  1/14/2019           After Visit Summary Signature Page    I have received my discharge instructions, and my questions have been answered. I have discussed any challenges I see with this plan with the nurse or doctor.    ..........................................................................................................................................  Patient/Patient Representative Signature      ..........................................................................................................................................  Patient Representative Print Name and Relationship to Patient    ..................................................               ................................................  Date                                   Time    ..........................................................................................................................................  Reviewed by Signature/Title    ...................................................              ..............................................  Date                                               Time          22EPIC Rev 08/18

## 2019-01-15 NOTE — ED PROVIDER NOTES
History     Chief Complaint:  Headache    HPI   Augustin Hernandez is a 53 year old male with a history of HTN who presents to the emergency department for evaluation of headache. The patient reports he has had 2 episodes of right-sided headache, eye pain, and throat pain today, but the pain began around 4 days ago. He states the pain is constant during the episodes, and he has blurry vision during the episodes as well. He indicates he is currently having one of these episodes, last was around 0300. The patient denies any neck pain, though on further evaluation, states he has right posterior neck pain. He denies any history of stroke or family history of stroke or intracranial hemorrhage. He last took HTN medications last week. The patient denies any chest pain, shortness of breath, leg pain, nausea, or vomiting.  He denies any numbness, weakness, recent head injury, or photophobia.    Allergies:  NKDA     Medications:    Augmentin  Catapres  Flexeril  Lisinopril    Past Medical History:    HTN    Past Surgical History:    Hernia repair    Family History:    No past pertinent family history.    Social History:  Presents alone.  Marital Status:   [2]  Negative for alcohol use.  Current every day smoker, 0.5 ppd.     Review of Systems   HENT: Positive for sore throat.    Eyes: Positive for pain and visual disturbance.   Respiratory: Negative for shortness of breath.    Cardiovascular: Negative for chest pain.   Gastrointestinal: Negative for nausea and vomiting.   Musculoskeletal: Negative for myalgias.   Neurological: Positive for headaches.   All other systems reviewed and are negative.    Physical Exam     Patient Vitals for the past 24 hrs:   BP Temp Temp src Pulse Heart Rate Resp SpO2 Height Weight   01/14/19 2245 (!) 163/107 -- -- 68 69 14 97 % -- --   01/14/19 2230 (!) 156/111 -- -- 67 66 21 97 % -- --   01/14/19 2215 (!) 168/127 -- -- 74 75 19 97 % -- --   01/14/19 2200 (!) 161/110 -- -- 70  "69 17 95 % -- --   01/14/19 2145 (!) 169/118 -- -- 70 69 16 95 % -- --   01/14/19 2130 (!) 175/121 -- -- 75 -- -- -- -- --   01/14/19 2115 (!) 170/117 -- -- 74 74 14 97 % -- --   01/14/19 2100 (!) 175/137 -- -- 80 80 14 98 % -- --   01/14/19 2059 (!) 175/137 -- -- 81 -- 18 98 % -- --   01/14/19 2047 (!) 192/122 -- -- 78 -- 20 98 % -- --   01/14/19 2036 (!) 176/122 -- -- -- -- -- -- -- --   01/14/19 2033 (!) 185/120 98  F (36.7  C) Temporal 84 84 16 98 % 1.778 m (5' 10\") 88 kg (194 lb)     Physical Exam  Gen: Pleasant, appears stated age.    Head: Pain to palpation of right temple, no arterial pulsations or nodules palpated.    Eye:   Pupils are equal, round, and reactive.     Scleral injection bilaterally.    ENT:   Moist mucus membranes.     Normal tongue.    Oropharynx without lesions.    Cardiac:     Normal rate and regular rhythm.    No murmurs, gallops, or rubs.    Pulmonary:     Clear to auscultation bilaterally.    No wheezes, rales, or rhonchi.    Abdomen:     Normal active bowel sounds.     Abdomen is soft and non-distended, without focal tenderness.    Musculoskeletal:     Normal movement of all extremities without evidence for deficit.   Full range of motion and neck is supple.    Extremities:    No edema.    Skin:   Warm and dry.    Neurologic:    Speech is fluent, cognition is normal.     EOMI, symmetric grimace.     Str 5/5 in RUE, LUE, RLE, LLE.     Fine touch sensation intact in BUE/BLE.    Psychiatric:     Normal affect with appropriate interaction with examiner.    Emergency Department Course   ECG:  Time: 2059  Vent. Rate 80 bpm. ME interval 166. QRS duration 84. QT/QTc 394/454. P-R-T axis 54 -3 48. Normal sinus rhythm. Possible left atrial enlargement. Nonspecific T wave abnormality. Abnormal ECG. Read time: 2131    Imaging:  Radiographic findings were communicated with the patient who voiced understanding of the findings.    CTA Head Neck with Contrast  1. No evidence of carotid or vertebral " artery dissection or arterial  occlusion.  2. Tortuous distal cervical internal carotid arteries.  3. Peripheral pulmonary emphysema in the upper lobes.    CT Head w/o Contrast  Normal CT scan of the head.  ABIOLA KINGSTON MD    Laboratory:  CBC: WBC: 11.4 (H), HGB: 16.8, PLT: 333  BMP: all WNL (Creatinine: 1.22)  ESR: 6  2046 Troponin I: <0.015    Interventions:  2055 Labetalol 20 mg IV  2056 Reglan 10 mg IV  2113 Fentanyl 50 mcg IV  2150 Dilaudid 0.5 mg IV  2151 Labetalol 20 mg IV  2220 Clonidine 0.1 mg PO  2220 Lisinopril 20 mg IV    Emergency Department Course:  Nursing notes and vitals reviewed. 2048 I performed an exam of the patient as documented above.     EKG obtained in the ED, see results above.     IV inserted. Medicine administered as documented above. Blood drawn. This was sent to the lab for further testing, results above.    The patient was sent for a CT Head, CTA Head Neck while in the emergency department, findings above.     2138 I rechecked the patient and discussed the results of his workup thus far.     2255: I rechecked the patient. Findings and plan explained to the Patient. Patient discharged home with instructions regarding supportive care, medications, and reasons to return. The importance of close follow-up was reviewed.     Impression & Plan      Medical Decision Making:  This is a 53-year-old male with history of hypertension who presents with several days of severe headache.  Upon initial exam, the patient was markedly hypertensive with diastolics well over 100.  Otherwise, he has no meningismus and no focal neurologic findings.  Given severe headache in the setting of very elevated blood pressures, I was concerned about possible subarachnoid hemorrhage versus arterial dissection.  Fortunately, CT of the head and CTA of the head and neck are negative for hemorrhage, dissection, or aneurysm.  Otherwise, he does not have evidence for endorgan damage at this time.  Creatinine is within  normal limits, and EKG does not demonstrate any ischemic changes.  He has no evidence for acute heart failure.  He does not present with confusion therefore I do not suspect hypertensive encephalopathy.  Given pain that localized behind the right eye, I considered glaucoma however pressures in the right eye are normal ages.  He has a normal reactive pupil on that side.  I also considered giant cell arteritis however ESR is negative.  Symptoms have improved with the above interventions.  Blood pressure remains elevated though significantly improved.  He was provided with a prescription for lisinopril/hydrochlorothiazide.  I emphasized the importance of close follow-up with PCP in regards to elevated blood pressure as well as the possibility of stroke, MI and renal failure if blood pressures are not controlled.  Otherwise, I think he is safe to be discharged from the emergency department at this time.  He will return to the ED for worsening headache, chest pain, shortness of breath, numbness or weakness, or for any other concerns.    Diagnosis:    ICD-10-CM    1. Acute nonintractable headache, unspecified headache type R51    2. Hypertension, unspecified type I10      Discharge Medications:     lisinopril 40 MG tablet  Commonly known as:  PRINIVIL/ZESTRIL          Marco MAO, am serving as a scribe on 1/14/2019 at 8:49 PM to personally document services performed by Leatha Galloway MD based on my observations and the provider's statements to me.     Marco Chavarria  1/14/2019    EMERGENCY DEPARTMENT       Leatha Galloway MD  01/14/19 5887

## 2019-01-15 NOTE — ED NOTES
"Dr. Galloway spoke with patient about close follow up with PCP and taking medication. Pt verbalized understanding. Pt stated \"I will\".   "

## 2019-01-15 NOTE — DISCHARGE INSTRUCTIONS
Discharge Instructions  Headache    You were seen today for a headache. Headaches may be caused by many different things such as muscle tension, sinus inflammation, anxiety and stress, having too little sleep, too much alcohol, some medical conditions or injury. You may have a migraine, which is caused by changes in the blood vessels in your head.  At this time your provider does not find that your headache is a sign of anything dangerous or life-threatening.  However, sometimes the signs of serious illness do not show up right away.      Generally, every Emergency Department visit should have a follow-up clinic visit with either a primary or a specialty clinic/provider. Please follow-up as instructed by your emergency provider today.    Return to the Emergency Department if:  You get a new fever of 100.4 F or higher.  Your headache gets much worse.  You get a stiff neck with your headache.  You get a new headache that is significantly different or worse than headaches you have had before.  You are vomiting (throwing up) and cannot keep food or water down.  You have blurry or double vision or other problems with your eyes.  You have a new weakness on one side of your body.  You have difficulty with balance which is new.  You or your family thinks you are confused.  You have a seizure.    What can I do to help myself?  Pain medications - You may take a pain medication such as Tylenol  (acetaminophen), Advil , Motrin  (ibuprofen) or Aleve  (naproxen).  Take a pain reliever as soon as you notice symptoms.  Starting medications as soon as you start to have symptoms may lessen the amount of pain you have.  Relaxing in a quiet, dark room may help.  Get enough sleep and eat meals regularly.  You may need to watch for certain foods or other things which may trigger your headaches.  Keeping a journal of your headaches and possible triggers may help you and your primary provider to identify things which you should avoid which  may be causing your headaches.  If you were given a prescription for medicine here today, be sure to read all of the information (including the package insert) that comes with your prescription.  This will include important information about the medicine, its side effects, and any warnings that you need to know about.  The pharmacist who fills the prescription can provide more information and answer questions you may have about the medicine.  If you have questions or concerns that the pharmacist cannot address, please call or return to the Emergency Department.   Remember that you can always come back to the Emergency Department if you are not able to see your regular provider in the amount of time listed above, if you get any new symptoms, or if there is anything that worries you.  Discharge Instructions  Hypertension - High Blood Pressure    During you visit to the Emergency Department, your blood pressure was higher than the recommended blood pressure.  This may be related to stress, pain, medication or other temporary conditions. In these cases, your blood pressure may return to normal on its own. If you have a history of high blood pressure, you may need to have your provider adjust your medications. Sometimes, your high measurement here may indicate that you have developed high blood pressure that will stay high unless it is treated. As a general rule, high blood pressure causes problems over years rather than days, weeks, or months. So, while it is important to treat blood pressure, it is rarely important to treat blood pressure immediately. Occasionally we will begin a medication in the Emergency Department; more often we will recommend close follow-up for medications with a primary doctor/clinic.    Generally, every Emergency Department visit should have a follow-up clinic visit with either a primary or a specialty clinic/provider. Please follow-up as instructed by your emergency provider today.    Return  to the Emergency Department if you start to have:  A severe headache.  Chest pain.  Shortness of breath.  Weakness or numbness that affects one part of the body.  Confusion.  Vision changes.  Significant swelling of legs and/or eyes.  A reaction to any medication started in the Emergency Department.    What can I do to help myself?  Avoid alcohol.  Take any blood pressure medicine that you are prescribed.  Get a good night?s sleep.  Lower your salt intake.  Exercise.  Lose weight.  Manage stress.  See your doctor regularly    If blood pressure medication was started in the Emergency Department:  The medicine may not have an immediate effect. The body and brain determine what blood pressure you have. The medicine?s job is to retrain the body?s ?thermostat? to a lower blood pressure.  You will need to follow up with your provider to see how this medicine is working for you.  If you were given a prescription for medicine here today, be sure to read all of the information (including the package insert) that comes with your prescription.  This will include important information about the medicine, its side effects, and any warnings that you need to know about.  The pharmacist who fills the prescription can provide more information and answer questions you may have about the medicine.  If you have questions or concerns that the pharmacist cannot address, please call or return to the Emergency Department.   Remember that you can always come back to the Emergency Department if you are not able to see your regular provider in the amount of time listed above, if you get any new symptoms, or if there is anything that worries you.

## 2019-01-18 ENCOUNTER — APPOINTMENT (OUTPATIENT)
Dept: CT IMAGING | Facility: CLINIC | Age: 54
End: 2019-01-18

## 2019-01-18 ENCOUNTER — HOSPITAL ENCOUNTER (EMERGENCY)
Facility: CLINIC | Age: 54
Discharge: HOME | End: 2019-01-18
Attending: EMERGENCY MEDICINE | Admitting: EMERGENCY MEDICINE

## 2019-01-18 VITALS
BODY MASS INDEX: 27.92 KG/M2 | SYSTOLIC BLOOD PRESSURE: 125 MMHG | DIASTOLIC BLOOD PRESSURE: 78 MMHG | TEMPERATURE: 97.6 F | HEIGHT: 70 IN | RESPIRATION RATE: 12 BRPM | OXYGEN SATURATION: 100 % | WEIGHT: 195 LBS | HEART RATE: 82 BPM

## 2019-01-18 DIAGNOSIS — R51.9 HEADACHE, UNSPECIFIED HEADACHE TYPE: ICD-10-CM

## 2019-01-18 PROCEDURE — 25000128 H RX IP 250 OP 636: Performed by: EMERGENCY MEDICINE

## 2019-01-18 PROCEDURE — 70450 CT HEAD/BRAIN W/O DYE: CPT

## 2019-01-18 PROCEDURE — 99284 EMERGENCY DEPT VISIT MOD MDM: CPT | Mod: Z6 | Performed by: EMERGENCY MEDICINE

## 2019-01-18 PROCEDURE — 96361 HYDRATE IV INFUSION ADD-ON: CPT | Performed by: EMERGENCY MEDICINE

## 2019-01-18 PROCEDURE — 96375 TX/PRO/DX INJ NEW DRUG ADDON: CPT | Performed by: EMERGENCY MEDICINE

## 2019-01-18 PROCEDURE — 96372 THER/PROPH/DIAG INJ SC/IM: CPT | Performed by: EMERGENCY MEDICINE

## 2019-01-18 PROCEDURE — 99284 EMERGENCY DEPT VISIT MOD MDM: CPT | Mod: 25 | Performed by: EMERGENCY MEDICINE

## 2019-01-18 PROCEDURE — 96374 THER/PROPH/DIAG INJ IV PUSH: CPT | Performed by: EMERGENCY MEDICINE

## 2019-01-18 RX ORDER — DIPHENHYDRAMINE HYDROCHLORIDE 50 MG/ML
25 INJECTION INTRAMUSCULAR; INTRAVENOUS ONCE
Status: COMPLETED | OUTPATIENT
Start: 2019-01-18 | End: 2019-01-18

## 2019-01-18 RX ORDER — OLANZAPINE 10 MG/2ML
5 INJECTION, POWDER, FOR SOLUTION INTRAMUSCULAR ONCE
Status: COMPLETED | OUTPATIENT
Start: 2019-01-18 | End: 2019-01-18

## 2019-01-18 RX ORDER — ELETRIPTAN HYDROBROMIDE 20 MG/1
20 TABLET, FILM COATED ORAL
Qty: 20 TABLET | Refills: 0 | Status: SHIPPED | OUTPATIENT
Start: 2019-01-18 | End: 2019-02-17

## 2019-01-18 RX ORDER — HYDRALAZINE HYDROCHLORIDE 20 MG/ML
10 INJECTION INTRAMUSCULAR; INTRAVENOUS ONCE
Status: COMPLETED | OUTPATIENT
Start: 2019-01-18 | End: 2019-01-18

## 2019-01-18 RX ADMIN — HYDRALAZINE HYDROCHLORIDE 10 MG: 20 INJECTION INTRAMUSCULAR; INTRAVENOUS at 03:02

## 2019-01-18 RX ADMIN — DIPHENHYDRAMINE HYDROCHLORIDE 25 MG: 50 INJECTION, SOLUTION INTRAMUSCULAR; INTRAVENOUS at 03:04

## 2019-01-18 RX ADMIN — SODIUM CHLORIDE 1000 ML: 9 INJECTION, SOLUTION INTRAVENOUS at 03:01

## 2019-01-18 RX ADMIN — OLANZAPINE 5 MG: 10 INJECTION, POWDER, FOR SOLUTION INTRAMUSCULAR at 03:03

## 2019-01-18 ASSESSMENT — MIFFLIN-ST. JEOR: SCORE: 1745.76

## 2019-01-18 NOTE — DISCHARGE INSTRUCTIONS
Please make an appointment to follow up with Your Primary Care Provider in 3-7 days     Return to the ED if you are having fever, weakness, worsening symptoms, or any urgent/life-threatening concerns.

## 2019-01-18 NOTE — ED PROVIDER NOTES
"  History     Chief Complaint   Patient presents with     Headache     HPI  Augustin Hernandez is a 53 year old male with PMH notable for HTN who presents to the ED with headache. Symptoms started 1 week ago. Onset gradual. Headache has been resolving for a few hours each day, then returns. Pain severe. Associated photophobia and phonophobia. No aura. No neck pain/stiffness, no fever, no numbness, no weakness. Pain located on right side of head. No vision change, no recent trauma.     I have reviewed the Medications, Allergies, Past Medical and Surgical History, and Social History in the Epic system.    Review of Systems  A complete review of systems was performed with pertinent positives and negatives noted in the HPI, and all other systems negative.     Physical Exam   BP: (!) 175/117  Pulse: 82  Heart Rate: 77  Temp: 97.6  F (36.4  C)  Resp: 16  Height: 177.8 cm (5' 10\")  Weight: 88.5 kg (195 lb)  SpO2: 99 %    Physical Exam  General: uncomfortable appearing. Appears stated age.   HENT: MMM, no oropharyngeal lesions  Eyes: PERRL, normal sclerae  Neck: non-tender, supple  Cardio: regular rate. Regular rhythm. Extremities well perfused  Resp: Normal work of breathing, normal respiratory rate  Neuro: alert and fully oriented. No confusion. CN II-XII intact to testing. Strength left: 5/5 , 5/5 elbow flexion, 5/5 elbow extension, 5/5 shoulder abduction, 5/5 hip flexion, 5/5 knee flexion, 5/5 knee extension. Strength right: 5/5 , 5/5 elbow flexion, 5/5 elbow extension, 5/5 shoulder abduction, 5/5 hip flexion, 5/5 knee flexion, 5/5 knee extension. Sensation intact to soft touch in all extremities. No pronator drift. 2+ brachial and patellar reflexes. Normal tone, no clonus. Normal coordination with finger-nose.   MSK: no deformities.   Integumentary/Skin: no rash visualized, normal color  Psych: normal affect, normal behavior    ED Course      Procedures        Critical Care time:  none       Labs " Ordered and Resulted from Time of ED Arrival Up to the Time of Departure from the ED - No data to display         Assessments & Plan (with Medical Decision Making)   Patient presenting with headache. Vitals in the ED with elevated BP, otherwise wnl. Initial differential diagnosis includes but not limited to migraine, tension HA, intracranial mass, cluster HA.     CT head without visualized pathology. IV established, NS bolus, diphenhydramine, and olanzapine given for headache with resolution of symptoms upon reassessment.     The complete clinical picture is most consistent with headache, likely migraine. After counseling on the diagnosis, work-up, and treatment plan, the patient was discharged to home with his wife, who will be driving. Eletriptan prescription provided. The patient was advised to follow-up with his PCP in a few days. The patient was advised to return to the ED if worsening symptoms, fever, weakness, or if there are any urgent/life-threatening concerns.       Clinical Impression:  Headache, likely migraine       Aakash Morfin MD  Emergency Medicine     I have reviewed the nursing notes.  I have reviewed the findings, diagnosis, plan and need for follow up with the patient.  Current Discharge Medication List          Final diagnoses:   Headache, unspecified headache type       1/18/2019   [facility], [place], EMERGENCY DEPARTMENT     Aakash Morfin MD  01/18/19 [id number]

## 2019-01-18 NOTE — ED AVS SNAPSHOT
[facility], [place], Emergency Department  [address] [place] [id number]  Phone:  [phone]                                    Augustin Hernandez   MRN: [id number]    Department:  [facility], Emergency Department   Date of Visit:  1/18/2019           After Visit Summary Signature Page    I have received my discharge instructions, and my questions have been answered. I have discussed any challenges I see with this plan with the nurse or doctor.    ..........................................................................................................................................  Patient/Patient Representative Signature      ..........................................................................................................................................  Patient Representative Print Name and Relationship to Patient    ..................................................               ................................................  Date                                   Time    ..........................................................................................................................................  Reviewed by Signature/Title    ...................................................              ..............................................  Date                                               Time          22EPIC Rev 08/18

## 2019-01-18 NOTE — ED TRIAGE NOTES
Pt. Presents to ED with c/o severe headache that has been worsening all week. Pt. Hypertensive, OVSS on RA. Pt. Denies all neuro symptoms other than photophobia. Pt. Reports that he has had migraines before but never this bad.

## 2019-01-25 ENCOUNTER — HOSPITAL ENCOUNTER (INPATIENT)
Facility: CLINIC | Age: 54
LOS: 2 days | Discharge: HOME | DRG: 378 | End: 2019-01-27
Attending: EMERGENCY MEDICINE | Admitting: HOSPITALIST

## 2019-01-25 DIAGNOSIS — K92.2 GASTROINTESTINAL HEMORRHAGE, UNSPECIFIED GASTROINTESTINAL HEMORRHAGE TYPE: ICD-10-CM

## 2019-01-25 DIAGNOSIS — R79.89 TROPONIN LEVEL ELEVATED: ICD-10-CM

## 2019-01-25 DIAGNOSIS — D64.9 ANEMIA, UNSPECIFIED TYPE: ICD-10-CM

## 2019-01-25 DIAGNOSIS — G43.909 MIGRAINE WITHOUT STATUS MIGRAINOSUS, NOT INTRACTABLE, UNSPECIFIED MIGRAINE TYPE: ICD-10-CM

## 2019-01-25 DIAGNOSIS — K25.4 GASTROINTESTINAL HEMORRHAGE ASSOCIATED WITH GASTRIC ULCER: ICD-10-CM

## 2019-01-25 DIAGNOSIS — K08.89 TOOTH PAIN: Primary | ICD-10-CM

## 2019-01-25 DIAGNOSIS — N17.9 ACUTE KIDNEY INJURY (H): ICD-10-CM

## 2019-01-25 DIAGNOSIS — M62.81 GENERALIZED MUSCLE WEAKNESS: ICD-10-CM

## 2019-01-25 DIAGNOSIS — F17.200 TOBACCO USE DISORDER: ICD-10-CM

## 2019-01-25 LAB
ALBUMIN SERPL-MCNC: 2.8 G/DL (ref 3.4–5)
ALP SERPL-CCNC: 50 U/L (ref 40–150)
ALT SERPL W P-5'-P-CCNC: 62 U/L (ref 0–70)
ANION GAP SERPL CALCULATED.3IONS-SCNC: 6 MMOL/L (ref 3–14)
AST SERPL W P-5'-P-CCNC: 40 U/L (ref 0–45)
BASOPHILS # BLD AUTO: 0 10E9/L (ref 0–0.2)
BASOPHILS NFR BLD AUTO: 0.2 %
BILIRUB DIRECT SERPL-MCNC: <0.1 MG/DL (ref 0–0.2)
BILIRUB SERPL-MCNC: 0.2 MG/DL (ref 0.2–1.3)
BLD PROD TYP BPU: NORMAL
BLD UNIT ID BPU: 0
BLOOD PRODUCT CODE: NORMAL
BPU ID: NORMAL
BUN SERPL-MCNC: 36 MG/DL (ref 7–30)
CALCIUM SERPL-MCNC: 8.1 MG/DL (ref 8.5–10.1)
CHLORIDE SERPL-SCNC: 107 MMOL/L (ref 94–109)
CO2 SERPL-SCNC: 27 MMOL/L (ref 20–32)
CREAT SERPL-MCNC: 1.46 MG/DL (ref 0.66–1.25)
DIFFERENTIAL METHOD BLD: ABNORMAL
EOSINOPHIL # BLD AUTO: 0.1 10E9/L (ref 0–0.7)
EOSINOPHIL NFR BLD AUTO: 0.8 %
ERYTHROCYTE [DISTWIDTH] IN BLOOD BY AUTOMATED COUNT: 12.9 % (ref 10–15)
GFR SERPL CREATININE-BSD FRML MDRD: 55 ML/MIN/{1.73_M2}
GLUCOSE SERPL-MCNC: 120 MG/DL (ref 70–99)
HCT VFR BLD AUTO: 18.5 % (ref 40–53)
HEMOCCULT STL QL: POSITIVE
HGB BLD-MCNC: 6.6 G/DL (ref 13.3–17.7)
IMM GRANULOCYTES # BLD: 0 10E9/L (ref 0–0.4)
IMM GRANULOCYTES NFR BLD: 0.3 %
INR PPP: 0.99 (ref 0.86–1.14)
LYMPHOCYTES # BLD AUTO: 2.3 10E9/L (ref 0.8–5.3)
LYMPHOCYTES NFR BLD AUTO: 23.6 %
MCH RBC QN AUTO: 32.8 PG (ref 26.5–33)
MCHC RBC AUTO-ENTMCNC: 35.7 G/DL (ref 31.5–36.5)
MCV RBC AUTO: 92 FL (ref 78–100)
MONOCYTES # BLD AUTO: 0.6 10E9/L (ref 0–1.3)
MONOCYTES NFR BLD AUTO: 5.9 %
NEUTROPHILS # BLD AUTO: 6.7 10E9/L (ref 1.6–8.3)
NEUTROPHILS NFR BLD AUTO: 69.2 %
NRBC # BLD AUTO: 0 10*3/UL
NRBC BLD AUTO-RTO: 0 /100
PLATELET # BLD AUTO: 208 10E9/L (ref 150–450)
POTASSIUM SERPL-SCNC: 3.7 MMOL/L (ref 3.4–5.3)
PROT SERPL-MCNC: 5.8 G/DL (ref 6.8–8.8)
RBC # BLD AUTO: 2.01 10E12/L (ref 4.4–5.9)
SODIUM SERPL-SCNC: 140 MMOL/L (ref 133–144)
TRANSFUSION STATUS PATIENT QL: NORMAL
TRANSFUSION STATUS PATIENT QL: NORMAL
TROPONIN I SERPL-MCNC: 0.21 UG/L (ref 0–0.04)
WBC # BLD AUTO: 9.7 10E9/L (ref 4–11)

## 2019-01-25 PROCEDURE — 82272 OCCULT BLD FECES 1-3 TESTS: CPT | Performed by: EMERGENCY MEDICINE

## 2019-01-25 PROCEDURE — 85610 PROTHROMBIN TIME: CPT | Performed by: EMERGENCY MEDICINE

## 2019-01-25 PROCEDURE — 86901 BLOOD TYPING SEROLOGIC RH(D): CPT | Performed by: EMERGENCY MEDICINE

## 2019-01-25 PROCEDURE — 84484 ASSAY OF TROPONIN QUANT: CPT | Performed by: EMERGENCY MEDICINE

## 2019-01-25 PROCEDURE — 86850 RBC ANTIBODY SCREEN: CPT | Performed by: EMERGENCY MEDICINE

## 2019-01-25 PROCEDURE — 99223 1ST HOSP IP/OBS HIGH 75: CPT | Mod: AI | Performed by: HOSPITALIST

## 2019-01-25 PROCEDURE — 96366 THER/PROPH/DIAG IV INF ADDON: CPT

## 2019-01-25 PROCEDURE — 25000128 H RX IP 250 OP 636: Performed by: EMERGENCY MEDICINE

## 2019-01-25 PROCEDURE — 85025 COMPLETE CBC W/AUTO DIFF WBC: CPT | Performed by: EMERGENCY MEDICINE

## 2019-01-25 PROCEDURE — 80048 BASIC METABOLIC PNL TOTAL CA: CPT | Performed by: EMERGENCY MEDICINE

## 2019-01-25 PROCEDURE — 96361 HYDRATE IV INFUSION ADD-ON: CPT

## 2019-01-25 PROCEDURE — 80076 HEPATIC FUNCTION PANEL: CPT | Performed by: EMERGENCY MEDICINE

## 2019-01-25 PROCEDURE — P9016 RBC LEUKOCYTES REDUCED: HCPCS | Performed by: EMERGENCY MEDICINE

## 2019-01-25 PROCEDURE — 96375 TX/PRO/DX INJ NEW DRUG ADDON: CPT

## 2019-01-25 PROCEDURE — 86923 COMPATIBILITY TEST ELECTRIC: CPT | Performed by: EMERGENCY MEDICINE

## 2019-01-25 PROCEDURE — 96365 THER/PROPH/DIAG IV INF INIT: CPT

## 2019-01-25 PROCEDURE — 12000000 ZZH R&B MED SURG/OB

## 2019-01-25 PROCEDURE — C9113 INJ PANTOPRAZOLE SODIUM, VIA: HCPCS | Performed by: EMERGENCY MEDICINE

## 2019-01-25 PROCEDURE — 86900 BLOOD TYPING SEROLOGIC ABO: CPT | Performed by: EMERGENCY MEDICINE

## 2019-01-25 PROCEDURE — 99285 EMERGENCY DEPT VISIT HI MDM: CPT | Mod: 25

## 2019-01-25 RX ORDER — KETOROLAC TROMETHAMINE 15 MG/ML
15 INJECTION, SOLUTION INTRAMUSCULAR; INTRAVENOUS ONCE
Status: COMPLETED | OUTPATIENT
Start: 2019-01-25 | End: 2019-01-25

## 2019-01-25 RX ORDER — DIPHENHYDRAMINE HYDROCHLORIDE 50 MG/ML
25 INJECTION INTRAMUSCULAR; INTRAVENOUS ONCE
Status: COMPLETED | OUTPATIENT
Start: 2019-01-25 | End: 2019-01-25

## 2019-01-25 RX ORDER — SODIUM CHLORIDE 9 MG/ML
1000 INJECTION, SOLUTION INTRAVENOUS CONTINUOUS
Status: DISCONTINUED | OUTPATIENT
Start: 2019-01-25 | End: 2019-01-26

## 2019-01-25 RX ADMIN — PROCHLORPERAZINE EDISYLATE 5 MG: 5 INJECTION INTRAMUSCULAR; INTRAVENOUS at 22:12

## 2019-01-25 RX ADMIN — SODIUM CHLORIDE 80 MG: 9 INJECTION, SOLUTION INTRAVENOUS at 23:37

## 2019-01-25 RX ADMIN — DIPHENHYDRAMINE HYDROCHLORIDE 25 MG: 50 INJECTION, SOLUTION INTRAMUSCULAR; INTRAVENOUS at 22:11

## 2019-01-25 RX ADMIN — KETOROLAC TROMETHAMINE 15 MG: 15 INJECTION, SOLUTION INTRAMUSCULAR; INTRAVENOUS at 22:12

## 2019-01-25 RX ADMIN — SODIUM CHLORIDE 1000 ML: 9 INJECTION, SOLUTION INTRAVENOUS at 22:12

## 2019-01-25 ASSESSMENT — ENCOUNTER SYMPTOMS
SHORTNESS OF BREATH: 1
FEVER: 0
DIZZINESS: 1
HEADACHES: 1
DIARRHEA: 1
PHOTOPHOBIA: 1
VOMITING: 0
WEAKNESS: 1

## 2019-01-25 ASSESSMENT — MIFFLIN-ST. JEOR: SCORE: 1723.08

## 2019-01-26 ENCOUNTER — APPOINTMENT (OUTPATIENT)
Dept: CARDIOLOGY | Facility: CLINIC | Age: 54
DRG: 378 | End: 2019-01-26

## 2019-01-26 PROBLEM — K92.2 GI BLEED: Status: ACTIVE | Noted: 2019-01-26

## 2019-01-26 LAB
ABO + RH BLD: NORMAL
ABO + RH BLD: NORMAL
ALBUMIN SERPL-MCNC: 2.5 G/DL (ref 3.4–5)
ALP SERPL-CCNC: 42 U/L (ref 40–150)
ALT SERPL W P-5'-P-CCNC: 53 U/L (ref 0–70)
ANION GAP SERPL CALCULATED.3IONS-SCNC: 7 MMOL/L (ref 3–14)
AST SERPL W P-5'-P-CCNC: 31 U/L (ref 0–45)
BASOPHILS # BLD AUTO: 0 10E9/L (ref 0–0.2)
BASOPHILS NFR BLD AUTO: 0.1 %
BILIRUB SERPL-MCNC: 0.3 MG/DL (ref 0.2–1.3)
BLD GP AB SCN SERPL QL: NORMAL
BLD PROD TYP BPU: NORMAL
BLD PROD TYP BPU: NORMAL
BLD UNIT ID BPU: 0
BLOOD BANK CMNT PATIENT-IMP: NORMAL
BLOOD PRODUCT CODE: NORMAL
BPU ID: NORMAL
BUN SERPL-MCNC: 36 MG/DL (ref 7–30)
CALCIUM SERPL-MCNC: 7.5 MG/DL (ref 8.5–10.1)
CHLORIDE SERPL-SCNC: 112 MMOL/L (ref 94–109)
CO2 SERPL-SCNC: 25 MMOL/L (ref 20–32)
CREAT SERPL-MCNC: 1.62 MG/DL (ref 0.66–1.25)
DIFFERENTIAL METHOD BLD: ABNORMAL
EOSINOPHIL # BLD AUTO: 0.1 10E9/L (ref 0–0.7)
EOSINOPHIL NFR BLD AUTO: 1.7 %
ERYTHROCYTE [DISTWIDTH] IN BLOOD BY AUTOMATED COUNT: 13.9 % (ref 10–15)
GFR SERPL CREATININE-BSD FRML MDRD: 48 ML/MIN/{1.73_M2}
GLUCOSE SERPL-MCNC: 99 MG/DL (ref 70–99)
HCT VFR BLD AUTO: 21.4 % (ref 40–53)
HGB BLD-MCNC: 7.6 G/DL (ref 13.3–17.7)
HGB BLD-MCNC: 7.9 G/DL (ref 13.3–17.7)
IMM GRANULOCYTES # BLD: 0 10E9/L (ref 0–0.4)
IMM GRANULOCYTES NFR BLD: 0.3 %
INTERPRETATION ECG - MUSE: NORMAL
LYMPHOCYTES # BLD AUTO: 2.6 10E9/L (ref 0.8–5.3)
LYMPHOCYTES NFR BLD AUTO: 34.2 %
MCH RBC QN AUTO: 31.9 PG (ref 26.5–33)
MCHC RBC AUTO-ENTMCNC: 35.5 G/DL (ref 31.5–36.5)
MCV RBC AUTO: 90 FL (ref 78–100)
MONOCYTES # BLD AUTO: 0.6 10E9/L (ref 0–1.3)
MONOCYTES NFR BLD AUTO: 7.5 %
NEUTROPHILS # BLD AUTO: 4.3 10E9/L (ref 1.6–8.3)
NEUTROPHILS NFR BLD AUTO: 56.2 %
NRBC # BLD AUTO: 0 10*3/UL
NRBC BLD AUTO-RTO: 0 /100
NUM BPU REQUESTED: 2
PLATELET # BLD AUTO: 161 10E9/L (ref 150–450)
POTASSIUM SERPL-SCNC: 3.9 MMOL/L (ref 3.4–5.3)
PROT SERPL-MCNC: 5.1 G/DL (ref 6.8–8.8)
RBC # BLD AUTO: 2.38 10E12/L (ref 4.4–5.9)
SODIUM SERPL-SCNC: 144 MMOL/L (ref 133–144)
SPECIMEN EXP DATE BLD: NORMAL
TRANSFUSION STATUS PATIENT QL: NORMAL
TRANSFUSION STATUS PATIENT QL: NORMAL
TROPONIN I SERPL-MCNC: 0.19 UG/L (ref 0–0.04)
TROPONIN I SERPL-MCNC: 0.2 UG/L (ref 0–0.04)
TROPONIN I SERPL-MCNC: 0.24 UG/L (ref 0–0.04)
WBC # BLD AUTO: 7.7 10E9/L (ref 4–11)

## 2019-01-26 PROCEDURE — 99232 SBSQ HOSP IP/OBS MODERATE 35: CPT | Performed by: INTERNAL MEDICINE

## 2019-01-26 PROCEDURE — 36415 COLL VENOUS BLD VENIPUNCTURE: CPT | Performed by: HOSPITALIST

## 2019-01-26 PROCEDURE — 93306 TTE W/DOPPLER COMPLETE: CPT | Mod: 26 | Performed by: INTERNAL MEDICINE

## 2019-01-26 PROCEDURE — 27210582 ZZH DEVICE CLIP RESOLUTION, EACH: Performed by: INTERNAL MEDICINE

## 2019-01-26 PROCEDURE — 85025 COMPLETE CBC W/AUTO DIFF WBC: CPT | Performed by: HOSPITALIST

## 2019-01-26 PROCEDURE — 25500064 ZZH RX 255 OP 636: Performed by: HOSPITALIST

## 2019-01-26 PROCEDURE — 0W3P8ZZ CONTROL BLEEDING IN GASTROINTESTINAL TRACT, VIA NATURAL OR ARTIFICIAL OPENING ENDOSCOPIC: ICD-10-PCS | Performed by: INTERNAL MEDICINE

## 2019-01-26 PROCEDURE — 80053 COMPREHEN METABOLIC PANEL: CPT | Performed by: HOSPITALIST

## 2019-01-26 PROCEDURE — 84484 ASSAY OF TROPONIN QUANT: CPT | Performed by: HOSPITALIST

## 2019-01-26 PROCEDURE — 25000128 H RX IP 250 OP 636: Performed by: INTERNAL MEDICINE

## 2019-01-26 PROCEDURE — 25000128 H RX IP 250 OP 636: Performed by: HOSPITALIST

## 2019-01-26 PROCEDURE — P9016 RBC LEUKOCYTES REDUCED: HCPCS | Performed by: EMERGENCY MEDICINE

## 2019-01-26 PROCEDURE — 40000264 ECHOCARDIOGRAM COMPLETE

## 2019-01-26 PROCEDURE — C9113 INJ PANTOPRAZOLE SODIUM, VIA: HCPCS | Performed by: INTERNAL MEDICINE

## 2019-01-26 PROCEDURE — 25000132 ZZH RX MED GY IP 250 OP 250 PS 637: Performed by: HOSPITALIST

## 2019-01-26 PROCEDURE — 25000132 ZZH RX MED GY IP 250 OP 250 PS 637: Performed by: INTERNAL MEDICINE

## 2019-01-26 PROCEDURE — 43235 EGD DIAGNOSTIC BRUSH WASH: CPT | Performed by: INTERNAL MEDICINE

## 2019-01-26 PROCEDURE — 85018 HEMOGLOBIN: CPT | Performed by: HOSPITALIST

## 2019-01-26 PROCEDURE — 25000125 ZZHC RX 250: Performed by: INTERNAL MEDICINE

## 2019-01-26 PROCEDURE — 25000128 H RX IP 250 OP 636: Performed by: EMERGENCY MEDICINE

## 2019-01-26 PROCEDURE — 12000000 ZZH R&B MED SURG/OB

## 2019-01-26 PROCEDURE — C9113 INJ PANTOPRAZOLE SODIUM, VIA: HCPCS | Performed by: EMERGENCY MEDICINE

## 2019-01-26 PROCEDURE — G0500 MOD SEDAT ENDO SERVICE >5YRS: HCPCS | Performed by: INTERNAL MEDICINE

## 2019-01-26 RX ORDER — LIDOCAINE 40 MG/G
CREAM TOPICAL
Status: DISCONTINUED | OUTPATIENT
Start: 2019-01-26 | End: 2019-01-27 | Stop reason: HOSPADM

## 2019-01-26 RX ORDER — LIDOCAINE 40 MG/G
CREAM TOPICAL
Status: CANCELLED | OUTPATIENT
Start: 2019-01-26

## 2019-01-26 RX ORDER — NITROGLYCERIN 0.4 MG/1
0.4 TABLET SUBLINGUAL EVERY 5 MIN PRN
Status: DISCONTINUED | OUTPATIENT
Start: 2019-01-26 | End: 2019-01-27 | Stop reason: HOSPADM

## 2019-01-26 RX ORDER — PROCHLORPERAZINE MALEATE 5 MG
10 TABLET ORAL EVERY 6 HOURS PRN
Status: DISCONTINUED | OUTPATIENT
Start: 2019-01-26 | End: 2019-01-27 | Stop reason: HOSPADM

## 2019-01-26 RX ORDER — NALOXONE HYDROCHLORIDE 0.4 MG/ML
.1-.4 INJECTION, SOLUTION INTRAMUSCULAR; INTRAVENOUS; SUBCUTANEOUS
Status: DISCONTINUED | OUTPATIENT
Start: 2019-01-26 | End: 2019-01-26

## 2019-01-26 RX ORDER — ACETAMINOPHEN 325 MG/1
650 TABLET ORAL EVERY 4 HOURS PRN
Status: DISCONTINUED | OUTPATIENT
Start: 2019-01-26 | End: 2019-01-27 | Stop reason: HOSPADM

## 2019-01-26 RX ORDER — FENTANYL CITRATE 50 UG/ML
25 INJECTION, SOLUTION INTRAMUSCULAR; INTRAVENOUS EVERY 5 MIN PRN
Status: DISCONTINUED | OUTPATIENT
Start: 2019-01-26 | End: 2019-01-26

## 2019-01-26 RX ORDER — BISACODYL 10 MG
10 SUPPOSITORY, RECTAL RECTAL DAILY PRN
Status: DISCONTINUED | OUTPATIENT
Start: 2019-01-26 | End: 2019-01-26

## 2019-01-26 RX ORDER — NALOXONE HYDROCHLORIDE 0.4 MG/ML
.1-.4 INJECTION, SOLUTION INTRAMUSCULAR; INTRAVENOUS; SUBCUTANEOUS
Status: DISCONTINUED | OUTPATIENT
Start: 2019-01-26 | End: 2019-01-27 | Stop reason: HOSPADM

## 2019-01-26 RX ORDER — HYDROMORPHONE HYDROCHLORIDE 1 MG/ML
0.2 INJECTION, SOLUTION INTRAMUSCULAR; INTRAVENOUS; SUBCUTANEOUS
Status: DISCONTINUED | OUTPATIENT
Start: 2019-01-26 | End: 2019-01-27 | Stop reason: HOSPADM

## 2019-01-26 RX ORDER — AMOXICILLIN 250 MG
2 CAPSULE ORAL 2 TIMES DAILY PRN
Status: DISCONTINUED | OUTPATIENT
Start: 2019-01-26 | End: 2019-01-27 | Stop reason: HOSPADM

## 2019-01-26 RX ORDER — OXYCODONE HYDROCHLORIDE 5 MG/1
5-10 TABLET ORAL
Status: DISCONTINUED | OUTPATIENT
Start: 2019-01-26 | End: 2019-01-26

## 2019-01-26 RX ORDER — ACETAMINOPHEN 650 MG/1
650 SUPPOSITORY RECTAL EVERY 4 HOURS PRN
Status: DISCONTINUED | OUTPATIENT
Start: 2019-01-26 | End: 2019-01-27 | Stop reason: HOSPADM

## 2019-01-26 RX ORDER — LIDOCAINE 40 MG/G
CREAM TOPICAL
Status: DISCONTINUED | OUTPATIENT
Start: 2019-01-26 | End: 2019-01-26

## 2019-01-26 RX ORDER — POLYETHYLENE GLYCOL 3350 17 G/17G
17 POWDER, FOR SOLUTION ORAL DAILY PRN
Status: DISCONTINUED | OUTPATIENT
Start: 2019-01-26 | End: 2019-01-27 | Stop reason: HOSPADM

## 2019-01-26 RX ORDER — AMOXICILLIN 250 MG
1 CAPSULE ORAL 2 TIMES DAILY PRN
Status: DISCONTINUED | OUTPATIENT
Start: 2019-01-26 | End: 2019-01-27 | Stop reason: HOSPADM

## 2019-01-26 RX ORDER — FENTANYL CITRATE 50 UG/ML
INJECTION, SOLUTION INTRAMUSCULAR; INTRAVENOUS PRN
Status: DISCONTINUED | OUTPATIENT
Start: 2019-01-26 | End: 2019-01-26 | Stop reason: HOSPADM

## 2019-01-26 RX ORDER — FENTANYL CITRATE 50 UG/ML
50 INJECTION, SOLUTION INTRAMUSCULAR; INTRAVENOUS
Status: DISCONTINUED | OUTPATIENT
Start: 2019-01-26 | End: 2019-01-26

## 2019-01-26 RX ORDER — FLUMAZENIL 0.1 MG/ML
0.2 INJECTION, SOLUTION INTRAVENOUS
Status: DISCONTINUED | OUTPATIENT
Start: 2019-01-26 | End: 2019-01-27 | Stop reason: HOSPADM

## 2019-01-26 RX ORDER — ONDANSETRON 2 MG/ML
4 INJECTION INTRAMUSCULAR; INTRAVENOUS EVERY 6 HOURS PRN
Status: DISCONTINUED | OUTPATIENT
Start: 2019-01-26 | End: 2019-01-27 | Stop reason: HOSPADM

## 2019-01-26 RX ORDER — SODIUM CHLORIDE 9 MG/ML
INJECTION, SOLUTION INTRAVENOUS CONTINUOUS
Status: DISCONTINUED | OUTPATIENT
Start: 2019-01-26 | End: 2019-01-26

## 2019-01-26 RX ORDER — ONDANSETRON 4 MG/1
4 TABLET, ORALLY DISINTEGRATING ORAL EVERY 6 HOURS PRN
Status: DISCONTINUED | OUTPATIENT
Start: 2019-01-26 | End: 2019-01-27 | Stop reason: HOSPADM

## 2019-01-26 RX ORDER — HYDROCODONE BITARTRATE AND ACETAMINOPHEN 5; 325 MG/1; MG/1
1 TABLET ORAL EVERY 6 HOURS PRN
Status: DISCONTINUED | OUTPATIENT
Start: 2019-01-26 | End: 2019-01-26

## 2019-01-26 RX ORDER — NICOTINE 21 MG/24HR
1 PATCH, TRANSDERMAL 24 HOURS TRANSDERMAL DAILY
Status: DISCONTINUED | OUTPATIENT
Start: 2019-01-26 | End: 2019-01-27 | Stop reason: HOSPADM

## 2019-01-26 RX ORDER — PROCHLORPERAZINE 25 MG
25 SUPPOSITORY, RECTAL RECTAL EVERY 12 HOURS PRN
Status: DISCONTINUED | OUTPATIENT
Start: 2019-01-26 | End: 2019-01-27 | Stop reason: HOSPADM

## 2019-01-26 RX ADMIN — ACETAMINOPHEN 650 MG: 325 TABLET, FILM COATED ORAL at 19:39

## 2019-01-26 RX ADMIN — PANTOPRAZOLE SODIUM 40 MG: 40 INJECTION, POWDER, FOR SOLUTION INTRAVENOUS at 20:55

## 2019-01-26 RX ADMIN — SODIUM CHLORIDE 8 MG/HR: 9 INJECTION, SOLUTION INTRAVENOUS at 00:19

## 2019-01-26 RX ADMIN — PROCHLORPERAZINE EDISYLATE 10 MG: 5 INJECTION INTRAMUSCULAR; INTRAVENOUS at 01:28

## 2019-01-26 RX ADMIN — NICOTINE 1 PATCH: 14 PATCH, EXTENDED RELEASE TRANSDERMAL at 15:34

## 2019-01-26 RX ADMIN — SODIUM CHLORIDE 8 MG/HR: 9 INJECTION, SOLUTION INTRAVENOUS at 09:23

## 2019-01-26 RX ADMIN — HUMAN ALBUMIN MICROSPHERES AND PERFLUTREN 2 ML: 10; .22 INJECTION, SOLUTION INTRAVENOUS at 12:21

## 2019-01-26 RX ADMIN — HYDROCODONE BITARTRATE AND ACETAMINOPHEN 1 TABLET: 5; 325 TABLET ORAL at 16:37

## 2019-01-26 RX ADMIN — SODIUM CHLORIDE: 9 INJECTION, SOLUTION INTRAVENOUS at 08:10

## 2019-01-26 RX ADMIN — HYDROMORPHONE HYDROCHLORIDE 0.2 MG: 1 INJECTION, SOLUTION INTRAMUSCULAR; INTRAVENOUS; SUBCUTANEOUS at 13:15

## 2019-01-26 ASSESSMENT — ACTIVITIES OF DAILY LIVING (ADL)
ADLS_ACUITY_SCORE: 10

## 2019-01-26 ASSESSMENT — MIFFLIN-ST. JEOR: SCORE: 1721.72

## 2019-01-26 NOTE — ED PROVIDER NOTES
History     Chief Complaint:  Dizziness    HPI   Augustin Hernandez is a 53 year old male who presents with headache and generalized weakness. The patient states that he unloads trucks for a living, and 4 days ago when he was in [place] unloading a truck he started to feel very weak and dizzy, and had tingling in his fingers. The patient then went to the hospital that day, and underwent CT and CTA scans, results shown below. After coming home, the patient then startred to experience diarrhea, photophobia, and shortness of breath, prompting him to visit the emergency department again to be further evaluated. The patient denies any fever, vomiting, or any other associated symptoms.  He denies drug or alcohol use or heavy ibuprofen use or history of bleeding in the stool.    CT HEAD W/O CONTRAST 1/18/2019 3:55 AM:  No acute intracranial pathology.  Per Radiologist.     CT ANGIOGRAM OF THE HEAD AND NECK WITHOUT AND WITH CONTRAST  1/14/2019 9:44 PM:  1. No evidence of carotid or vertebral artery dissection or arterial  occlusion.  2. Tortuous distal cervical internal carotid arteries.  3. Peripheral pulmonary emphysema in the upper lobes.  Per Radiologist.     Allergies:  No Known Drug Allergies    Medications:    Catapres  Relpax  Prinzide    Past Medical History:    Hypertension    Past Surgical History:    Hernia Repair    Family History:    The patient denies any relevant family medical history.    Social History:  Marital Status:    Smoking Status: Every day  Alcohol Use: No  Drug Use: No    Review of Systems   Constitutional: Negative for fever.   Eyes: Positive for photophobia.   Respiratory: Positive for shortness of breath.    Gastrointestinal: Positive for diarrhea. Negative for vomiting.   Neurological: Positive for dizziness, weakness and headaches.   All other systems reviewed and are negative.    Physical Exam     Patient Vitals for the past 24 hrs:   BP Temp Temp src Heart Rate Resp SpO2  "Height Weight   01/25/19 2200 -- -- -- -- -- 100 % -- --   01/25/19 2029 104/58 97.9  F (36.6  C) Oral 107 20 100 % 1.778 m (5' 10\") 86.2 kg (190 lb)     Physical Exam  VS: Reviewed per above  HENT: Mucous membranes moist  EYES: sclera anicteric  CV: Rate as noted, regular rhythm.   RESP: Effort normal. Breath sounds are normal bilaterally.  GI: no tenderness, not distended.  Rectal: Black stool on digital rectal exam  NEURO: Alert, moving all extremities 5 out of 5 strength, sensation is intact to light touch in all 4 extremities, cranial nerves II through XII intact.  MSK: No deformity of the extremities  SKIN: Warm and dry    Emergency Department Course   ECG:  ECG taken at 2159, ECG read at 2203  Normal sinus rhythm  Nonspecific T wave abnormality  Prolonged QT  Abnormal ECG  No significant change compared to 1/14/19  Rate 94 bpm. NH interval 144. QRS duration 92. QT/QTc 368/460. P-R-T axes 60 42 85.    Laboratory:  BMP: Glucose 120 (H), Urea nitrogen 36 (H), GFR 55 (L), Calcium 8.1 (L), o/w WNL. (Creatinine 1.46 (H))  CBC: WNL. (WBC 9.7, HGB 6.6 (LL), )   Occult Blood Stool: Positive (A)  2214 Troponin: 0.207 (HH)  INR: Pending   ABO/Rh: Pending  Hepatic Panel:Albumin 2.8 (L), Protein total 5.8 (L), o/w WNL    Interventions:  2211: 25 mg Benadryl  2212: 15 mg Toradol IV  2212: 5 mg Compazine IV  2212: NS 1L IV Bolus  Please see MAR for full list of medications administered in the ED.      Emergency Department Course:  Past medical records, nursing notes, and vitals reviewed.  2140: I performed an exam of the patient and obtained history, as documented above.  2248: Spoke with laboratory who states patient has low hemoglobin.    EKG obtained in the ED, see results above.   IV inserted and blood drawn.    Admitted to hospital-Dr. Morales    Impression & Plan      Medical Decision Making:  Patient presents the ER with headache, intermittent chest discomfort, generalized weakness.  Vital signs notable for " heart rate of 107.  Initial exam was unremarkable.  Patient was given IV medications for headache with some improvement in his migraine.  Based on report this does not sound like worst headache of life or sudden onset headache to suggest spontaneous subarachnoid hemorrhage.  Additionally he recently had CT/CTA of the head and neck without any pathology noted.  EKG did not reveal specific signs of ischemia.  Interestingly CBC was notable for hemoglobin of 6.6 down from 16 just a few days ago.  Upon further assessment, patient does endorse black stool.  Digital rectal exam revealed black stool as well and I suspect source of hemoglobin dropped as occult GI bleed.  He was consented for blood and 2 units of packed red cells were ordered.  Protonix infusion drip was ordered as well.  No history of known liver disease or alcohol abuse or known varices to suggest indication for octreotide.  He remained vitally stable while in the ER.  Troponin did return slightly elevated at 0.2.  I suspect this is secondary to demand in the setting of significant blood loss anemia.  Aspirin was withheld given GI bleed.  He was admitted to the hospital for further management.  Upon signout to my colleague, he was awaiting inpatient bed.    Diagnosis:    ICD-10-CM    1. Migraine without status migrainosus, not intractable, unspecified migraine type G43.909 CBC with platelets differential     Basic metabolic panel     Occult blood stool     Hepatic panel     Hepatic panel     Troponin I     Troponin I     INR     INR     ABO/Rh type and screen     CANCELED: Hepatic panel     CANCELED: Troponin I     CANCELED: INR   2. Generalized muscle weakness M62.81    3. Anemia, unspecified type D64.9    4. Gastrointestinal hemorrhage, unspecified gastrointestinal hemorrhage type K92.2    5. Troponin level elevated R74.8          Disposition:  admitted to hospital    Mark Alejandra  1/25/2019    EMERGENCY DEPARTMENT    I, Mark Alejandra, ann serving as a  scribe at 9:40 PM on 1/25/2019 to document services personally performed by Ramesh Taylor MD based on my observations and the provider's statements to me.          Ramesh Taylor MD  01/25/19 6121

## 2019-01-26 NOTE — PROGRESS NOTES
[facility]    Hospitalist Progress Note    Interval History   - Feeling better after EGD, ADAT  - Having significant tooth pain--dentist consult  - Quit smoking five days ago--will provide patch  - Discharge tomorrow    Assessment & Plan   Summary: Augustin Hernandez is a 53 year old male with PMH tobacco use disorder, chronic depression who was admitted on 1/25/2019 with melena, anemia, suspected to have GI bleed. EGD 1/26, s/p clip to antral gastric ulcer.    Acute blood loss anemia  Acute upper GI bleed  Presented with Hgb 6.6 (baseline ~16), melena, tachycardia. EGD 1/26 revealed bleeding antral ulcer which was clipped. Cause likely due to excessive recent NSAID use. S/p 2u pRBC here  - Appreciate GI involvement  - Avoid NSAIDs  - PPI IV BID, Hgb q12h  - Repeat EGD in two months    Dental pain  Headaches/migraines  Has been taking aspirin and Motrin at home to deal with these issues, including being seen in ED on 1/8/2019, 1/14, and 1/18. CTA being negative for acute pathology, it was thought his headaches could be due to migraines. Then, on 1/23/2019 he was seen in an ED in [place] for weakness, dyspnea, and burning chest pain; evaluation largely unremarkable at that time (HGB 12).   - Dentist consult (unsure if will se over weekend)  - Tylenol, oxy, dilaudid IV PRN    Demand ischemia secondary to acute GI bleed: No chest pain this admission. Trops variable up to 0.2. EKG nonischemic changes  - Recheck trop in AM    Acute kidney injury, pre-renal vs ATN: This is due to patient's GI bleed. Creatinine 1.46-->1.62 this AM. If creatinine continues to rise I would be more suspicious of ATN.    HTN: Hold antihypertensives, SBP 120s    DVT Prophylaxis: Pneumatic Compression Devices  Code Status: Full Code  PT/OT: not needed    Disposition: Expected discharge tomorrow if no further bleeding    Milton Carrillo MD  Text Page  (7am to 6pm)  -Data reviewed today: I reviewed all new labs and  imaging results over the last 24 hours.    Physical Exam   Temp: 98  F (36.7  C) Temp src: Oral BP: 117/77 Pulse: 80 Heart Rate: 85 Resp: 12 SpO2: 99 % O2 Device: None (Room air)    Vitals:    01/25/19 2029 01/26/19 0600   Weight: 86.2 kg (190 lb) 86 kg (189 lb 11.2 oz)     Vital Signs with Ranges  Temp:  [97.8  F (36.6  C)-98.8  F (37.1  C)] 98  F (36.7  C)  Pulse:  [80-98] 80  Heart Rate:  [] 85  Resp:  [11-26] 12  BP: ()/(52-92) 117/77  SpO2:  [96 %-100 %] 99 %  I/O last 3 completed shifts:  In: 528 [P.O.:70; I.V.:50]  Out: -   O2 requirements: none    Constitutional: Male in NAD  Cardiovascular: RRR, normal S1/2, no m/r/g  Respiratory: CTAB, no wheezing or crackles  Vascular: No LE pitting edema  GI: Normoactive bowel sounds, nontender  Skin/Integumen: No rashes  Neuro/Psych: Appropriate affect and mood. A&Ox3, moves all extremities    Medications     sodium chloride         fentaNYL  50 mcg Intravenous Once within 24 hrs     [START ON 1/27/2019] influenza vaccine adult (product based on age)  0.5 mL Intramuscular Prior to discharge     midazolam  2 mg Intravenous Once within 24 hrs     pantoprazole (PROTONIX) IV  40 mg Intravenous BID     sodium chloride (PF)  3 mL Intracatheter Q8H       Data   Recent Labs   Lab 01/26/19  1147 01/26/19  1009 01/26/19  0610 01/26/19  0115 01/25/19  2214   WBC  --   --  7.7  --  9.7   HGB 7.9*  --  7.6*  --  6.6*   MCV  --   --  90  --  92   PLT  --   --  161  --  208   INR  --   --   --   --  0.99   NA  --   --  144  --  140   POTASSIUM  --   --  3.9  --  3.7   CHLORIDE  --   --  112*  --  107   CO2  --   --  25  --  27   BUN  --   --  36*  --  36*   CR  --   --  1.62*  --  1.46*   ANIONGAP  --   --  7  --  6   FLORA  --   --  7.5*  --  8.1*   GLC  --   --  99  --  120*   ALBUMIN  --   --  2.5*  --  2.8*   PROTTOTAL  --   --  5.1*  --  5.8*   BILITOTAL  --   --  0.3  --  0.2   ALKPHOS  --   --  42  --  50   ALT  --   --  53  --  62   AST  --   --  31  --  40   TROPI   --  0.244* 0.195* 0.191* 0.207*       Imaging:   No results found for this or any previous visit (from the past 24 hour(s)).

## 2019-01-26 NOTE — H&P
[facility]    History and Physical  Hospitalist       Date of Admission:  1/25/2019  Date of Service (when I saw the patient): 01/25/19    ASSESSMENT  Augustin Hernandez is a very pleasant 53 year old gentleman former smoker with hypertension and chronic depression who presents with a number of complaints most notable for melena and is found to have severe acute anemia suspected due to acute upper GI bleed.    PLAN    1) Acute anemia suspected due to acute upper GI bleed:  Of note, Mr. Anneliese Hernandez has been seen multiple times in the ED recently. On 1/8/2019 he was seen for evaluation of dental pain, for which he had been taking Motrin and apsirin at home; he continues to take large quantities of these medications every day. Then he was seen 1/14 and again 1/18 for headache with photophobia; CTA being negative for acute pathology, it was thought his headaches could be due to migraines. Then, on 1/23/2019 he was seen in an ED in [place] for weakness, dyspnea, and burning chest pain; evaluation largely unremarkable at that time (HGB 12). Now he presents for multiple complaints but among them are epigastric pain and black stool with light headedness. He is found to have severe acute anemia and GI bleed is suspected, possibly PUD in the setting of NSAID use. He does not drink.    -- IMC. NPO, GI consulted. Protonix infusion continued; repeat HGB after transfusion completed and every 4-6 hours. NS IV Fluid 150 ml/hour. Tylenol, Oxycodone, IV Dilaudid as needed for pain. Anti-emetics as needed.    2) Myonecrosis: Possibly due to demand ischemia from anemia and tachycardia; he has no known history of CAD. We will rule out ACS.     -- Telemetry, serial enzymes, TTE ordered.     3) BREN: Possibly pre-renal hypovolemic. Repeat in AM after he has been volume resuscitated as above.    4) Hypertension: Hold anti-hypertensives for now; noting that he may take Clonidine so monitor for rebound  "hypertension    5) Headaches: With photophobia, possibly migraines, will try some Compazine    Chief Complaint   Abdominal pain    History is obtained from the patient and the ED physician whom I have spoken with    History of Present Illness   Augustin Hernandez is a markedly pleasant 53 year old gentleman who presents with abdominal; pain, burning, located in the epigastrium, non-radiating, that started 3 days ago and has been constant since onset and progressive; associated with stools turning a black color which he says has never happened to him before, as well as with light headedness, headaches, and generalized weakness that is worst in his upper arms. He is a  and travels a lot; he lives here. He has an infected tooth and has been taking 12-15 Motrin tablets a day as well as 4 steve aspirins every day at home for the pain. He does not drink any alcohol and denies any IV drug use. He has felt nauseated but has not vomited and denies hematochezia, dyspnea, chest pain or any other acute complaints.    In the ED, Blood pressure 104/58, temperature 97.9  F (36.6  C), temperature source Oral, resp. rate 20, height 1.778 m (5' 10\"), weight 86.2 kg (190 lb), SpO2 100 %.    CBC showed WBC 9.7. HGB 6.6 (11.9 on 1/23/2019 and 17 on 1/14/2019). . INR 0.99. CMP notable for BUN 36, Cr 1.46 (1.2 on 1/14); Alb 2.8, Tprot 5.8, other electrolytes and hepatic panel were in normal reference range. Stool occult blood was positive. EKG showed NSR without ST segment changes. He was given Protonic bolus and infusion was started as well as anti-emetics, Toradol, and IV fluid. 2 units packed red cell transfusion is also started.     PHYSICAL EXAM  Blood pressure 104/58, temperature 97.9  F (36.6  C), temperature source Oral, resp. rate 20, height 1.778 m (5' 10\"), weight 86.2 kg (190 lb), SpO2 100 %.  Constitutional: Alert and oriented to person, place and time; no apparent distress  HEENT: normocephalic " moist mucus membranes  Respiratory: lungs clear to auscultation bilaterally  Cardiovascular: regular S1 S2 GI: abdomen soft mildly tender in epigastrium, non distended bowel sounds positive  Lymph/Hematologic: no pallor, no cervical lymphadenopathy  Skin: no rash, good turgor  Musculoskeletal: no clubbing, cyanosis or edema  Neurologic: extra-ocular muscles intact; moves all four extremities  Psychiatric: appropriate affect, insight and judgment    Data   Labs Ordered and Resulted from Time of ED Arrival Up to the Time of Departure from the ED   CBC WITH PLATELETS DIFFERENTIAL - Abnormal; Notable for the following components:       Result Value    RBC Count 2.01 (*)     Hemoglobin 6.6 (*)     Hematocrit 18.5 (*)     All other components within normal limits   BASIC METABOLIC PANEL - Abnormal; Notable for the following components:    Glucose 120 (*)     Urea Nitrogen 36 (*)     Creatinine 1.46 (*)     GFR Estimate 55 (*)     Calcium 8.1 (*)     All other components within normal limits   HEPATIC PANEL   TROPONIN I   OCCULT BLOOD STOOL   INR   CARDIAC CONTINUOUS MONITORING   RED BLOOD CELL PREPARE ORDER UNIT   ABO/RH TYPE AND SCREEN       Data reviewed today:  I personally reviewed no images or EKG's today.    No results found for this or any previous visit (from the past 24 hour(s)).    DVT Prophylaxis: Pneumatic Compression Devices  Code Status: Full Code    Disposition: Expected discharge in 2-3 days    Leonel Edwards MD    Primary Care Physician   *Physician No Ref-Primary    Past Medical History    I have reviewed this patient's medical history and updated it with pertinent information if needed.   Past Medical History:   Diagnosis Date     Depression      Hypertension        Past Surgical History   I have reviewed this patient's surgical history and updated it with pertinent information if needed.  Past Surgical History:   Procedure Laterality Date     HERNIA REPAIR         Prior to Admission Medications    Prior to Admission Medications   Prescriptions Last Dose Informant Patient Reported? Taking?   UNKNOWN TO PATIENT   Yes No   cloNIDine (CATAPRES) 0.1 MG tablet   No No   Sig: Take 1 tablet (0.1 mg) by mouth 2 times daily for 14 days   eletriptan (RELPAX) 20 MG tablet   No No   Sig: Take 1 tablet (20 mg) by mouth at onset of headache for migraine   lisinopril-hydrochlorothiazide (PRINZIDE/ZESTORETIC) 20-25 MG tablet   No No   Sig: Take 1 tablet by mouth daily      Facility-Administered Medications: None     Allergies   No Known Allergies    Social History   I have reviewed this patient's social history and updated it with pertinent information if needed. Augustin [facility]ctor [facility]  reports that he has quit smoking. He smoked 0.50 packs per day. he has never used smokeless tobacco. He reports that he does not drink alcohol or use drugs.    Family History   Family history assessed and, except as above, is non-contributory.    Review of Systems   The 10 point Review of Systems is negative other than noted in the HPI or here.

## 2019-01-26 NOTE — PROGRESS NOTES
GI NOTE   See procedure report    EGD  - ulcer in antrum with oozing  Hemoclip placed    PPI IV   NPO until hemoglobin stable then can start clear liquids    Will continue to follow    Carol Carmichael MD  [facility]  [facility]

## 2019-01-26 NOTE — CONSULTS
Consult Date:  01/26/2019      GASTROENTEROLOGY CONSULTATION      CHIEF COMPLAINT:  Melena and lightheadedness.      HISTORY OF PRESENT ILLNESS:  We were asked to see this patient by his admitting physician, Dr. Leonel Edwards for further evaluation of melena and possible GI bleed.      The patient reports that he has had recent difficulties with migraines and tooth pain and has been seen several times in the ER for the above.  He also has a recent onset of dizziness and was seen actually in a [place] hospital earlier this week.  He then presented to [facility] Emergency Room with lightheadedness and black stool.  He did not have epigastric pain, nausea or vomiting.  In the ER he was found to have acute anemia with a drop in his hemoglobin this month from 12 to 6.  A GI bleed is suspected.      The patient reports that he has not had any abdominal pain.  He just noted his stools to be black starting the night of admission.  He admits he had been taking increased amounts of ibuprofen, both for his migraines and his tooth pain and he was taking 8-10 ibuprofen once per day.  The patient has no personal history of ulcer disease and there is no family history of ulcer disease.      At this time, on review of systems, he denies any dizziness or lightheadedness.  He has gotten blood transfusions of at least 2 units and he says he feels much better.  Again, he denies abdominal pain.      REVIEW OF SYSTEMS:   CONSTITUTIONAL:  No fever or chills.   CARDIAC:  He has not had any chest pain here in this hospital, although when he was seen in [place] on 01/23.  He did complain of some burning chest pain.   PULMONARY:  He has had no shortness of breath or dyspnea on exertion.   GASTROINTESTINAL:  As above.   GENITOURINARY:  He is urinating normally.   MUSCULOSKELETAL:  He has had some weakness, but denies any focal deficits.   NEUROLOGIC:  He is alert and oriented, grossly is nonfocal.   All other systems were negative.       PAST MEDICAL HISTORY:  Significant for depression, hypertension, recent left tooth pulled and migraines.      PAST SURGICAL HISTORY:  Includes hernia repair.      MEDICATIONS PRIOR TO ADMISSION:  Included Klonopin, Relpax, lisinopril, hydrochlorothiazide and ibuprofen as above.       ALLERGIES:  HE HAS NO KNOWN DRUG ALLERGIES.      SOCIAL HISTORY:  The patient denies tobacco and denies significant alcohol.      FAMILY HISTORY:  Negative for colon cancer, negative for ulcer disease.      PHYSICAL EXAMINATION:   GENERAL:  He is a well-nourished gentleman, alert and oriented, in no apparent distress.   VITAL SIGNS:  His temperature is 98.8, his pulse is 98.  His blood pressure is 121/79.   SKIN:  He did not have any orthostatic changes with sitting up.   HEENT:  Head is atraumatic, normocephalic.   SKIN:  Without evidence of jaundice or rash.  Sclerae nonicteric.   HEART:  S1, S2, without significant murmur.   LUNGS:  Clear to auscultation.   ABDOMEN:  Soft, nontender, no hepatosplenomegaly, no masses.   EXTREMITIES:  Right and left lower extremity without edema.   NEUROLOGIC:  He is alert and oriented, grossly is nonfocal.   PSYCHIATRIC:  No evidence of pressured speech or flattened affect.      LABORATORY DATA:  His electrolytes are within normal limits.  His BUN is slightly elevated at 36.  His creatinine is 1.62.  His liver functions are normal.  White blood cell count is normal.  His hemoglobin on admission was 6.6.  After transfusion partial transfusion overnight was 7.6 with a normal platelet count.  MCV is 90.  His troponins were slightly elevated at 0.207 0.191 and 0.195.      ASSESSMENT AND PLAN:   1.  Melena with frequent use of ibuprofen most likely etiology of his melena is an upper gastrointestinal source and the most likely source would be acid peptic disease related to his use of nonsteroidal anti-inflammatory agents.  At this time, he is stable without evidence of orthostatic changes.  He has  gotten transfusion.  We suggest upper endoscopy as soon as possible.  In the meantime, he will be kept n.p.o. and an IV Protonix drip with careful monitoring of his blood pressure, hemoglobin, troponins.   2.  Colon cancer screen.  This patient is overdue for colon cancer screening and we suggest that can be done as an outpatient once he is more stable.      Thank you for asking us to participate in his care.         NILESH TEIXEIRA MD             D: 2019   T: 2019   MT: JARRED      Name:     AIYANA VILLA   MRN:      [id number]        Account:       [id number]   :      1965           Consult Date:  2019      Document: [id number]       cc: Leonel Edwards MD

## 2019-01-26 NOTE — PLAN OF CARE
VSS, Tele SR, EGD complete, Hgb stable, IVF and Protonix gtt dc'd, voiding, tolerating clear liquids, no BM, c/o severe tooth/ mouth pain, pain controlled with IV dilaudid PRN, Dentist consult pending.

## 2019-01-26 NOTE — PROVIDER NOTIFICATION
Patient is currently saline locked, text paged Dr. Carrillo to get old IVF order discontinued.     Patient will transfer to 66.

## 2019-01-26 NOTE — PROGRESS NOTES
GI Note ( full consult dictated)    Patient seen and examined.    Patient with recent onset of melena probably due to excessive use of ibuprofen for migraines and tooth pain    S/p transfusions and no evidence of orthostasis    Plan  EGD this AM  Keep NPO  IV PPI    Carol Carmichael MD  [facility]  Office   [facility] [phone]

## 2019-01-26 NOTE — PLAN OF CARE
Patient is A/O x4. Soft BPs, improving following blood transfusion/fluids otherwise VSS on RA. Completed 1 unit PRBC, started second unit PRBC. Protonix gtt at 10 ml/h. C/o eye pain/headache, prn compazine given, effective. BS audible and active in all quadrants, no stools overnight.  Pt rested well overnight. Plan on GI consult and continuing to monitor hemoglobin today.

## 2019-01-26 NOTE — ED NOTES
"[facility]  ED Nurse Handoff Report    ED Chief complaint: Dizziness (was at [facility] in the ER a few days ago and pt had vertigo then, has not gotten any better, not taking any meds for this)      ED Diagnosis:   Final diagnoses:   Migraine without status migrainosus, not intractable, unspecified migraine type   Generalized muscle weakness   Anemia, unspecified type   Gastrointestinal hemorrhage, unspecified gastrointestinal hemorrhage type   Troponin level elevated       Code Status: Full Code    Allergies: No Known Allergies    Activity level - Baseline/Home:  Independent    Activity Level - Current:   Independent     Needed?: No    Isolation: No  Infection: Not Applicable  Bariatric?: No    Vital Signs:   Vitals:    01/25/19 2029 01/25/19 2200   BP: 104/58    Resp: 20    Temp: 97.9  F (36.6  C)    TempSrc: Oral    SpO2: 100% 100%   Weight: 86.2 kg (190 lb)    Height: 1.778 m (5' 10\")        Cardiac Rhythm: ,        Pain level: 0-10 Pain Scale: 9    Is this patient confused?: No   Does this patient have a guardian?  No         If yes, is there guardianship documents in the Epic \"Code/ACP\" activity?  N/A         Guardian Notified?  N/A  [place] - Suicide Severity Rating Scale Completed?  Yes  If yes, what color did the patient score?  White    Patient Report: Initial Complaint: Dizziness  Focused Assessment: Pt presented to ED c/o ongoing dizziness, 9/10 headache that has been going on since yesterday. Pt also states that he has been having black stools but denies any GI symptoms. Rectal exam performed in ED confirmed black stools. Pt was given 1 L Bolus NS and Headache Cocktail (Compazine; Benadryl; Toradol) which brought pain down to 3/10. VSS. Pt being admitted for further evaluation regarding low hemoglobin and Black stools.    Tests Performed: Labs; EKG;  Abnormal Results:  Labs Ordered and Resulted from Time of ED Arrival Up to the Time of Departure from the ED   CBC WITH " PLATELETS DIFFERENTIAL - Abnormal; Notable for the following components:       Result Value    RBC Count 2.01 (*)     Hemoglobin 6.6 (*)     Hematocrit 18.5 (*)     All other components within normal limits   BASIC METABOLIC PANEL - Abnormal; Notable for the following components:    Glucose 120 (*)     Urea Nitrogen 36 (*)     Creatinine 1.46 (*)     GFR Estimate 55 (*)     Calcium 8.1 (*)     All other components within normal limits   OCCULT BLOOD STOOL - Abnormal; Notable for the following components:    Occult Blood Positive (*)     All other components within normal limits   HEPATIC PANEL - Abnormal; Notable for the following components:    Albumin 2.8 (*)     Protein Total 5.8 (*)     All other components within normal limits   TROPONIN I - Abnormal; Notable for the following components:    Troponin I ES 0.207 (*)     All other components within normal limits   INR   CARDIAC CONTINUOUS MONITORING   RED BLOOD CELL PREPARE ORDER UNIT   ABO/RH TYPE AND SCREEN       Treatments provided: Please see below under ED Medications.    Family Comments: None at bedside.    OBS brochure/video discussed/provided to patient/family: N/A              Name of person given brochure if not patient: N/A              Relationship to patient: N/A    ED Medications:   Medications   0.9% sodium chloride BOLUS (0 mLs Intravenous Stopped 1/25/19 2337)     Followed by   sodium chloride 0.9% infusion (not administered)   pantoprazole (PROTONIX) 80 mg in sodium chloride 0.9 % 100 mL infusion (not administered)   ketorolac (TORADOL) injection 15 mg (15 mg Intravenous Given 1/25/19 2212)   prochlorperazine (COMPAZINE) injection 5 mg (5 mg Intravenous Given 1/25/19 2212)   diphenhydrAMINE (BENADRYL) injection 25 mg (25 mg Intravenous Given 1/25/19 2211)   pantoprazole (PROTONIX) 80 mg in sodium chloride 0.9 % 100 mL intermittent infusion (80 mg Intravenous New Bag 1/25/19 2337)       Drips infusing?:  Blood  For the majority of the shift this  patient was Green.   Interventions performed were Rounding for comfort.    Severe Sepsis OR Septic Shock Diagnosis Present: No    To be done/followed up on inpatient unit:  None     ED NURSE PHONE NUMBER: [phone]

## 2019-01-27 VITALS
SYSTOLIC BLOOD PRESSURE: 121 MMHG | TEMPERATURE: 97.7 F | WEIGHT: 192.24 LBS | RESPIRATION RATE: 16 BRPM | HEART RATE: 90 BPM | OXYGEN SATURATION: 98 % | HEIGHT: 70 IN | DIASTOLIC BLOOD PRESSURE: 79 MMHG | BODY MASS INDEX: 27.52 KG/M2

## 2019-01-27 LAB
ANION GAP SERPL CALCULATED.3IONS-SCNC: 6 MMOL/L (ref 3–14)
BUN SERPL-MCNC: 23 MG/DL (ref 7–30)
CALCIUM SERPL-MCNC: 7.7 MG/DL (ref 8.5–10.1)
CHLORIDE SERPL-SCNC: 109 MMOL/L (ref 94–109)
CO2 SERPL-SCNC: 26 MMOL/L (ref 20–32)
CREAT SERPL-MCNC: 1.27 MG/DL (ref 0.66–1.25)
ERYTHROCYTE [DISTWIDTH] IN BLOOD BY AUTOMATED COUNT: 13.6 % (ref 10–15)
GFR SERPL CREATININE-BSD FRML MDRD: 65 ML/MIN/{1.73_M2}
GLUCOSE SERPL-MCNC: 119 MG/DL (ref 70–99)
HCT VFR BLD AUTO: 22.8 % (ref 40–53)
HGB BLD-MCNC: 8.1 G/DL (ref 13.3–17.7)
MCH RBC QN AUTO: 31.6 PG (ref 26.5–33)
MCHC RBC AUTO-ENTMCNC: 35.5 G/DL (ref 31.5–36.5)
MCV RBC AUTO: 89 FL (ref 78–100)
PLATELET # BLD AUTO: 183 10E9/L (ref 150–450)
POTASSIUM SERPL-SCNC: 3.6 MMOL/L (ref 3.4–5.3)
RBC # BLD AUTO: 2.56 10E12/L (ref 4.4–5.9)
SODIUM SERPL-SCNC: 141 MMOL/L (ref 133–144)
TROPONIN I SERPL-MCNC: 0.26 UG/L (ref 0–0.04)
UPPER GI ENDOSCOPY: NORMAL
WBC # BLD AUTO: 7 10E9/L (ref 4–11)

## 2019-01-27 PROCEDURE — 80048 BASIC METABOLIC PNL TOTAL CA: CPT | Performed by: INTERNAL MEDICINE

## 2019-01-27 PROCEDURE — 25000132 ZZH RX MED GY IP 250 OP 250 PS 637: Performed by: HOSPITALIST

## 2019-01-27 PROCEDURE — 99238 HOSP IP/OBS DSCHRG MGMT 30/<: CPT | Performed by: INTERNAL MEDICINE

## 2019-01-27 PROCEDURE — 36415 COLL VENOUS BLD VENIPUNCTURE: CPT | Performed by: INTERNAL MEDICINE

## 2019-01-27 PROCEDURE — 25000128 H RX IP 250 OP 636: Performed by: INTERNAL MEDICINE

## 2019-01-27 PROCEDURE — 84484 ASSAY OF TROPONIN QUANT: CPT | Performed by: INTERNAL MEDICINE

## 2019-01-27 PROCEDURE — C9113 INJ PANTOPRAZOLE SODIUM, VIA: HCPCS | Performed by: INTERNAL MEDICINE

## 2019-01-27 PROCEDURE — 25000132 ZZH RX MED GY IP 250 OP 250 PS 637: Performed by: INTERNAL MEDICINE

## 2019-01-27 PROCEDURE — 85027 COMPLETE CBC AUTOMATED: CPT | Performed by: INTERNAL MEDICINE

## 2019-01-27 RX ORDER — OXYCODONE AND ACETAMINOPHEN 5; 325 MG/1; MG/1
1 TABLET ORAL 2 TIMES DAILY PRN
Qty: 6 TABLET | Refills: 0 | Status: SHIPPED | OUTPATIENT
Start: 2019-01-27 | End: 2019-01-30

## 2019-01-27 RX ORDER — AMOXICILLIN 250 MG
1 CAPSULE ORAL 2 TIMES DAILY PRN
Qty: 14 TABLET | Refills: 0 | Status: SHIPPED | OUTPATIENT
Start: 2019-01-27 | End: 2019-02-10

## 2019-01-27 RX ORDER — PANTOPRAZOLE SODIUM 40 MG/1
40 TABLET, DELAYED RELEASE ORAL 2 TIMES DAILY
Qty: 60 TABLET | Refills: 0 | Status: SHIPPED | OUTPATIENT
Start: 2019-01-27 | End: 2019-02-26

## 2019-01-27 RX ORDER — NICOTINE 21 MG/24HR
1 PATCH, TRANSDERMAL 24 HOURS TRANSDERMAL EVERY 24 HOURS
Qty: 30 PATCH | Refills: 0 | Status: SHIPPED | OUTPATIENT
Start: 2019-01-27 | End: 2019-02-26

## 2019-01-27 RX ADMIN — NICOTINE 1 PATCH: 14 PATCH, EXTENDED RELEASE TRANSDERMAL at 08:24

## 2019-01-27 RX ADMIN — Medication 3 MG: at 00:05

## 2019-01-27 RX ADMIN — PANTOPRAZOLE SODIUM 40 MG: 40 INJECTION, POWDER, FOR SOLUTION INTRAVENOUS at 08:24

## 2019-01-27 RX ADMIN — ACETAMINOPHEN 650 MG: 325 TABLET, FILM COATED ORAL at 06:39

## 2019-01-27 RX ADMIN — ACETAMINOPHEN 650 MG: 325 TABLET, FILM COATED ORAL at 00:05

## 2019-01-27 ASSESSMENT — MIFFLIN-ST. JEOR: SCORE: 1733.25

## 2019-01-27 ASSESSMENT — ACTIVITIES OF DAILY LIVING (ADL)
ADLS_ACUITY_SCORE: 10

## 2019-01-27 NOTE — PROGRESS NOTES
Patient transferred to [address] [facility], all personal belongings sent with patient. Report given to nurse.     Mayra SOLOMON RN

## 2019-01-27 NOTE — PROGRESS NOTES
Discharge    Patient discharged to home via car with friend    Listed belongings gathered and returned to patient. Yes  Care Plan and Patient education resolved: Yes  Prescriptions if needed, hard copies sent with patient  NA  Home and hospital acquired medications returned to patient: NA  Medication Bin checked and emptied on discharge Yes  Follow up appointment made for patient: No

## 2019-01-27 NOTE — PLAN OF CARE
Vital signs stable on room air. A/Ox4. Neuro's intact. Hemoglobin 7.9 at noon, recheck every 12 hours. Lap incision clean, dry, intact. CMS intact. Pain controlled with PRN norco. Pain mostly related to tooth/headache. Dental consult in place. Up with standby. Tolerating regular diet, encourage to start with soft foods. Denies N&V. Passing gas, no bm, bowel sounds present. Voiding adequately. LS clear. Plan to discharge tomorrow per MD note.

## 2019-01-27 NOTE — PLAN OF CARE
Pt A&O x4, up independently, VSS on RA. Denies SOB, dizziness, N/V, numbness/tingling. C/o L mouth pain due to a tooth. Tolerating regular diet. No active signs of bleeding present, hgb stable at 8.1. Creat 1.27, trop 0.259. LS clear, BS active x4. Discharging home, will call when meds are ready for .

## 2019-01-27 NOTE — PLAN OF CARE
Pt A/O x4. VSS on RA. Up with SBA. Pt c/o R headache and L lower tooth pain, tylenol administered, IV dilaudid available. Dentist is consulted. Advanced to a regular diet, tolerating. Voiding adequately. IV SL. on IV protonix. EGD completed today, ulcer clipped. No signs of bleeding. Last Hbg was 7.9. Recheck q12 hours. Anticipated D/C per MD tomorrow.  Nursing continue to monitor.

## 2019-01-27 NOTE — DISCHARGE SUMMARY
[facility]    Hospitalist Discharge Summary       Date of Admission:  1/25/2019  Date of Discharge:  1/27/2019  Discharging Provider: Milton Carrillo MD      Discharge Diagnoses   Upper GI bleed secondary to gastric ulcer from NSAID use  Acute blood loss anemia  Demand ischemia secondary to GI bleed  Dental pain  Recurrent headaches/migraines  Acute kidney injury    Follow-ups Needed After Discharge   Follow-up Appointments     Follow-up and recommended labs and tests       Follow up with primary care provider, Physician No Ref-Primary, within 7 days for hospital follow- up.  Check a complete blood count, basic metabolic panel, and tropon level within one week.  - Follow up with a repeat EGD with the GI clinic in about 2 months.  - Follow up with a dentist in one week or less.             Hospital Course      Summary: Augustin Hernandez is a 53 year old male with PMH tobacco use disorder, chronic depression who was admitted on 1/25/2019 with melena, anemia, suspected to have GI bleed. Presented with Hgb 6.6 (baseline ~16), melena, tachycardia. EGD 1/26 revealed bleeding antral ulcer which was clipped. Cause likely due to excessive recent NSAID use--patient with three recent visits to ED due to tooth pain and migraines and had been taking significant amounts of NSAIDs. Received 2u pRBC while here. Hgb uptrending to 8.1 at discharge.  - Avoid NSAIDs  - PPI PO BID  - Recheck CBC, BMP, and troponin in one week  - Repeat EGD in two months    Dental pain  Headaches/migraines  Has been taking aspirin and Motrin at home to deal with these issues, including being seen in ED on 1/8/2019, 1/14, and 1/18. CTA being negative for acute pathology, it was thought his headaches could be due to migraines. Then, on 1/23/2019 he was seen in an ED in [place] for weakness, dyspnea, and burning chest pain; evaluation largely unremarkable at that time (Hgb 12).  - Recommended patient to see dentist as  outpatient within a week  - Given his high health care utilization over the past month due to tooth pain, migraines, NSAID use, I provided patient with SIX percocet at discharge until he can see a dentist to have his teeth pain addressed    Demand ischemia secondary to acute GI bleed: No chest pain this admission. Trops variable up to 0.26. EKG nonischemic changes.  - Repeat troponin as outpatient to document full resolution  - Consider outpatient stress test    Acute kidney injury, pre-renal vs ATN: This is due to patient's GI bleed. Creatinine 1.46-->1.62--> 1.27.  - Repeat BMP in one week to document full resolution    HTN: SBP 110s to 120s at discharge due to GI bleed above  - Hold both clonidine and Zestoretic at discharge, resume per PCP    Tobacco use disorder: Patient quit approx 5 days PTA. Given nicotine patch which he says was helpful.    Consultations This Hospital Stay   GASTROENTEROLOGY IP CONSULT  DENTIST IP CONSULT    Code Status   Full Code    Time Spent on this Encounter   I, Milton Carrillo, personally saw the patient today and spent approximately 25 minutes discharging this patient.       Milton Carrilol MD  [facility]  ______________________________________________________________________    Physical Exam   Vital Signs: Temp: 97.7  F (36.5  C) Temp src: Oral BP: 121/79 Pulse: 90 Heart Rate: 79 Resp: 16 SpO2: 98 % O2 Device: None (Room air)    Weight: 192 lbs 3.86 oz    Constitutional: Male in NAD  HEENT: No facial or noticeable mucosal swelling around painful teeth  Cardiovascular: RRR, normal S1/2, no m/r/g  Respiratory: CTAB, no wheezing or crackles  Vascular: No LE pitting edema  GI: Normoactive bowel sounds, nontender  Skin/Integumen: No rashes  Neuro/Psych: Appropriate affect and mood. A&Ox3, moves all extremities      Primary Care Physician   Physician No Ref-Primary    Discharge Disposition   Discharged to home  Condition at discharge: Stable    Significant Results and  Procedures   Most Recent 3 CBC's:  Recent Labs   Lab Test 01/27/19  0855 01/26/19  1147 01/26/19  0610 01/25/19  2214   WBC 7.0  --  7.7 9.7   HGB 8.1* 7.9* 7.6* 6.6*   MCV 89  --  90 92     --  161 208     Most Recent 3 BMP's:  Recent Labs   Lab Test 01/27/19  0855 01/26/19  0610 01/25/19  2214    144 140   POTASSIUM 3.6 3.9 3.7   CHLORIDE 109 112* 107   CO2 26 25 27   BUN 23 36* 36*   CR 1.27* 1.62* 1.46*   ANIONGAP 6 7 6   FLORA 7.7* 7.5* 8.1*   * 99 120*     Most Recent 2 LFT's:  Recent Labs   Lab Test 01/26/19  0610 01/25/19 2214   AST 31 40   ALT 53 62   ALKPHOS 42 50   BILITOTAL 0.3 0.2     Most Recent 3 INR's:  Recent Labs   Lab Test 01/25/19 2214   INR 0.99   ,   Results for orders placed or performed during the hospital encounter of 01/18/19   CT Head w/o Contrast    Narrative    CT HEAD W/O CONTRAST 1/18/2019 3:55 AM    Provided History: See the Clinical Information for Interpreting  Provider; severe headache    Comparison: 1/14/2019.    Technique: Using multidetector thin collimation helical acquisition  technique, axial, coronal and sagittal CT images from the skull base  to the vertex were obtained without intravenous contrast.     Findings:    No intracranial hemorrhage, mass effect, or midline shift. The  ventricles are proportionate to the cerebral sulci. The gray to white  matter differentiation of the cerebral hemispheres is preserved. The  basal cisterns are patent. There is a partial empty sella    There is polypoid mucosal thickening within the left maxillary antrum.  The mastoid air cells are clear.         Impression    Impression:   No acute intracranial pathology.    I have personally reviewed the examination and initial interpretation  and I agree with the findings.    GUEVARA BARRERA MD       Discharge Orders      CBC with platelets    Last Lab Result: Hemoglobin (g/dL)       Date                     Value                 01/27/2019               8.1 (L)           ----------     Basic metabolic panel     Troponin I     Reason for your hospital stay    You were hospitalized for a gastrointestinal bleed. You had an ulcer in your stomach which was bleeding, which was treated.     Activity    Your activity upon discharge: activity as tolerated  - No heavy lifting for two days--no lifting over 20lbs     When to contact your care team    Please call your primary care doctor if you had dark/black stools or significant abdominal pain.     Discharge Instructions    Avoid ALL NSAIDs -- this includes Advil, Motrin, Excedrin, ibuprofen, naproxen, or other similar medications. You can take tylenol.     Follow-up and recommended labs and tests     Follow up with primary care provider, Physician No Ref-Primary, within 7 days for hospital follow- up.  Check a complete blood count, basic metabolic panel, and tropon level within one week.  - Follow up with a repeat EGD with the GI clinic in about 2 months.  - Follow up with a dentist in one week or less.     Full Code     Diet    Follow this diet upon discharge: Orders Placed This Encounter   Regular Diet Adult     Discharge Medications   Current Discharge Medication List      START taking these medications    Details   nicotine (NICODERM CQ) 14 MG/24HR 24 hr patch Place 1 patch onto the skin every 24 hours  Qty: 30 patch, Refills: 0    Associated Diagnoses: Tobacco use disorder      oxyCODONE-acetaminophen (PERCOCET) 5-325 MG tablet Take 1 tablet by mouth 2 times daily as needed for severe pain  Qty: 6 tablet, Refills: 0    Associated Diagnoses: Tooth pain      pantoprazole (PROTONIX) 40 MG EC tablet Take 1 tablet (40 mg) by mouth 2 times daily  Qty: 60 tablet, Refills: 0    Associated Diagnoses: Gastrointestinal hemorrhage associated with gastric ulcer      senna-docusate (SENOKOT-S/PERICOLACE) 8.6-50 MG tablet Take 1 tablet by mouth 2 times daily as needed for constipation  Qty: 14 tablet, Refills: 0    Associated Diagnoses: Tooth pain          CONTINUE these medications which have NOT CHANGED    Details   eletriptan (RELPAX) 20 MG tablet Take 1 tablet (20 mg) by mouth at onset of headache for migraine  Qty: 20 tablet, Refills: 0         STOP taking these medications       cloNIDine (CATAPRES) 0.1 MG tablet Comments:   Reason for Stopping:         lisinopril-hydrochlorothiazide (PRINZIDE/ZESTORETIC) 20-25 MG tablet Comments:   Reason for Stopping:             Allergies   No Known Allergies

## 2019-09-30 NOTE — ED NOTES
Back pain started 3 days ago, constantly getting worse. Epigastric burning started this a.m.  Taking ibuprofen with no relief.  Intermittent nausea, no vomiting.   SYL PAL is a 43y  at 39w1d who presented to L&D for an elective induction of labor. Pregnancy is complicated by AMA and hypothyroidism in pregnancy.     Plan  - induction with cytotec   - admission labs, consent   - levothyroxine 88mcg for hypothyroidism    d/w Dr. Elkins

## 2020-11-25 ENCOUNTER — MEDICAL CORRESPONDENCE (OUTPATIENT)
Dept: HEALTH INFORMATION MANAGEMENT | Facility: CLINIC | Age: 55
End: 2020-11-25

## 2020-11-30 ENCOUNTER — TELEPHONE (OUTPATIENT)
Dept: UROLOGY | Facility: CLINIC | Age: 55
End: 2020-11-30

## 2020-11-30 NOTE — TELEPHONE ENCOUNTER
[facility] Call Center    Phone Message    May a detailed message be left on voicemail: yes     Reason for Call: Appointment Intake    Referring Provider Name: Dr. Blanquita Costa at [facility]  Diagnosis and/or Symptoms: Testicular pain and hematuria.  Please review and then call Geovanna back to discuss scheduling options    Action Taken: Message routed to:  [facility] ([facility]  Urology    Travel Screening: Not Applicable

## 2020-12-04 ENCOUNTER — VIRTUAL VISIT (OUTPATIENT)
Dept: UROLOGY | Facility: CLINIC | Age: 55
End: 2020-12-04
Payer: COMMERCIAL

## 2020-12-04 VITALS — WEIGHT: 207 LBS | BODY MASS INDEX: 29.63 KG/M2 | HEIGHT: 70 IN

## 2020-12-04 DIAGNOSIS — R36.1 HEMATOSPERMIA: Primary | ICD-10-CM

## 2020-12-04 PROCEDURE — 99203 OFFICE O/P NEW LOW 30 MIN: CPT | Mod: 95 | Performed by: UROLOGY

## 2020-12-04 ASSESSMENT — PAIN SCALES - GENERAL: PAINLEVEL: SEVERE PAIN (7)

## 2020-12-04 ASSESSMENT — MIFFLIN-ST. JEOR: SCORE: 1790.2

## 2020-12-04 NOTE — PROGRESS NOTES
Urology Consult History and Physical  [facility]   Name: Augustin Hernandez    MRN: [id number]   YOB: 1965       We were asked to see Augustin Hernandez at the request of Dr. Costa for evaluation and treatment of hematospermia.        Chief Complaint:   Hematospermia     History is obtained from the patient            History of Present Illness:   Augustin Hernandez is a 55 year old male who is being seen for evaluation of hematospermia    Not been sexually active for the past year  Noted some hematospermia with masturbation about 2-3 weeks ago  This happened about 2-3 times  He has now abstained from masturbation   Recent STD check normal  No gross hematuria     No family history of prostate cancer            Past Medical History:     Past Medical History:   Diagnosis Date     Blood in semen      Depression      Hypertension             Past Surgical History:     Past Surgical History:   Procedure Laterality Date     ESOPHAGOSCOPY, GASTROSCOPY, DUODENOSCOPY (EGD), COMBINED N/A 1/26/2019    Procedure: COMBINED ESOPHAGOSCOPY, GASTROSCOPY, DUODENOSCOPY (EGD);  Surgeon: Carol Carmichael MD;  Location:  GI     HERNIA REPAIR              Social History:     Social History     Tobacco Use     Smoking status: Former Smoker     Packs/day: 0.50     Smokeless tobacco: Never Used   Substance Use Topics     Alcohol use: No       History   Smoking Status     Former Smoker     Packs/day: 0.50   Smokeless Tobacco     Never Used            Family History:   No family history on file.           Allergies:   No Known Allergies         Medications:     No current outpatient medications on file.     No current facility-administered medications for this visit.              Review of Systems:     Skin: negative  Eyes: negative  Ears/Nose/Throat: negative  Respiratory: No shortness of breath, dyspnea on exertion, cough, or hemoptysis  Cardiovascular: negative  Gastrointestinal:  "negative  Genitourinary: as above  Musculoskeletal: negative  Neurologic: negative  Psychiatric: negative  Hematologic/Lymphatic/Immunologic: negative  Endocrine: negative          Physical Exam:     PHYSICAL EXAM  Patient is a 55 year old  male   Vitals: Height 1.778 m (5' 10\"), weight 93.9 kg (207 lb).  Body mass index is 29.7 kg/m .  General Appearance Adult:   Alert, no acute distress, oriented  HENT: throat/mouth:normal, good dentition  Lungs: no respiratory distress, or pursed lip breathing  Heart: No obvious jugular venous distension present  Abdomen: non - distended  Musculoskeltal: extremities normal, no peripheral edema  Skin: no suspicious lesions or rashes  Neuro: Alert, oriented, speech and mentation normal  Psych: affect and mood normal  Gait: Normal           Data:   All laboratory data reviewed:    UA RESULTS:  11/19/20 - Normal, no RBCs    PSA:  11/19/20   0.3 with 50% free    Lab Results   Component Value Date    CR 1.27 01/27/2019             Impression and Plan:   Impression:   55-year-old man with a few episodes of hematospermia      Plan:   Hematospermia  -We discussed that isolated hematospermia is nonconcerning and does not warrant any further work-up  -Given that he has not had any hematuria or other concerning symptoms there is no further work-up needed at this time  -We discussed that he may follow-up with urology on a as needed basis  -I reviewed his outside labs and notes for about 5 to 10 minutes     Thank you for the kind consultation.    Chart documentation with Dragon Voice recognition Software. Although reviewed after completion, some words and grammatical errors may remain.     Time spent: 20 minutes of which >50% was spent counseling.    Manav Calvillo MD   Urology  [facility]  [facility] Phone: [phone]  [facility] Phone: [phone]       "

## 2020-12-04 NOTE — LETTER
"12/4/2020       RE: Augustin Hernandez  [address]  [place] [place] [facility] [address]     Dear Colleague,    Thank you for referring your patient, Augustin Hernandez, to the [facility] [place] at [facility]. Please see a copy of my visit note below.    Augustin Hernandez is a 55 year old male who is being evaluated via a billable video visit.      The patient has been notified of following:     \"This video visit will be conducted via a call between you and your physician/provider. We have found that certain health care needs can be provided without the need for an in-person physical exam.  This service lets us provide the care you need with a video conversation.  If a prescription is necessary we can send it directly to your pharmacy.  If lab work is needed we can place an order for that and you can then stop by our lab to have the test done at a later time.    Video visits are billed at different rates depending on your insurance coverage.  Please reach out to your insurance provider with any questions.    If during the course of the call the physician/provider feels a video visit is not appropriate, you will not be charged for this service.\"    Patient has given verbal consent for Video visit? Yes  How would you like to obtain your AVS? Mail a copy  If you are dropped from the video visit, the video invite should be resent to: Text to cell phone: [phone]  Will anyone else be joining your video visit? No  Keyanna Serrano CMA      Video-Visit Details    Type of service:  Video Visit    Video Start Time: 4:30  Video End Time: 4:50    Originating Location (pt. Location): Home    Distant Location (provider location):  [facility] [place]     Platform used for Video Visit: Latrell Calvillo MD        Urology Consult History and Physical  [facility]   Name: Augustin Hernandez    MRN: [id number] "   YOB: 1965       We were asked to see Augustin Hernandez at the request of Dr. Costa for evaluation and treatment of hematospermia.        Chief Complaint:   Hematospermia     History is obtained from the patient            History of Present Illness:   Augustin Hernandez is a 55 year old male who is being seen for evaluation of hematospermia    Not been sexually active for the past year  Noted some hematospermia with masturbation about 2-3 weeks ago  This happened about 2-3 times  He has now abstained from masturbation   Recent STD check normal  No gross hematuria     No family history of prostate cancer            Past Medical History:     Past Medical History:   Diagnosis Date     Blood in semen      Depression      Hypertension             Past Surgical History:     Past Surgical History:   Procedure Laterality Date     ESOPHAGOSCOPY, GASTROSCOPY, DUODENOSCOPY (EGD), COMBINED N/A 1/26/2019    Procedure: COMBINED ESOPHAGOSCOPY, GASTROSCOPY, DUODENOSCOPY (EGD);  Surgeon: Carol Carmichael MD;  Location: [facility]     HERNIA REPAIR              Social History:     Social History     Tobacco Use     Smoking status: Former Smoker     Packs/day: 0.50     Smokeless tobacco: Never Used   Substance Use Topics     Alcohol use: No       History   Smoking Status     Former Smoker     Packs/day: 0.50   Smokeless Tobacco     Never Used            Family History:   No family history on file.           Allergies:   No Known Allergies         Medications:     No current outpatient medications on file.     No current facility-administered medications for this visit.              Review of Systems:     Skin: negative  Eyes: negative  Ears/Nose/Throat: negative  Respiratory: No shortness of breath, dyspnea on exertion, cough, or hemoptysis  Cardiovascular: negative  Gastrointestinal: negative  Genitourinary: as above  Musculoskeletal: negative  Neurologic: negative  Psychiatric:  "negative  Hematologic/Lymphatic/Immunologic: negative  Endocrine: negative          Physical Exam:     PHYSICAL EXAM  Patient is a 55 year old  male   Vitals: Height 1.778 m (5' 10\"), weight 93.9 kg (207 lb).  Body mass index is 29.7 kg/m .  General Appearance Adult:   Alert, no acute distress, oriented  HENT: throat/mouth:normal, good dentition  Lungs: no respiratory distress, or pursed lip breathing  Heart: No obvious jugular venous distension present  Abdomen: non - distended  Musculoskeltal: extremities normal, no peripheral edema  Skin: no suspicious lesions or rashes  Neuro: Alert, oriented, speech and mentation normal  Psych: affect and mood normal  Gait: Normal           Data:   All laboratory data reviewed:    UA RESULTS:  11/19/20 - Normal, no RBCs    PSA:  11/19/20   0.3 with 50% free    Lab Results   Component Value Date    CR 1.27 01/27/2019             Impression and Plan:   Impression:   55-year-old man with a few episodes of hematospermia      Plan:   Hematospermia  -We discussed that isolated hematospermia is nonconcerning and does not warrant any further work-up  -Given that he has not had any hematuria or other concerning symptoms there is no further work-up needed at this time  -We discussed that he may follow-up with urology on a as needed basis  -I reviewed his outside labs and notes for about 5 to 10 minutes     Thank you for the kind consultation.    Chart documentation with Dragon Voice recognition Software. Although reviewed after completion, some words and grammatical errors may remain.     Time spent: 20 minutes of which >50% was spent counseling.    Manav Calvillo MD   Urology  [facility]  [facility] Phone: [phone]  [facility] Phone: [phone]         Again, thank you for allowing me to participate in the care of your patient.      Sincerely,    Manav Calvillo MD      "

## 2020-12-04 NOTE — PROGRESS NOTES
"Augustin Hernandez is a 55 year old male who is being evaluated via a billable video visit.      The patient has been notified of following:     \"This video visit will be conducted via a call between you and your physician/provider. We have found that certain health care needs can be provided without the need for an in-person physical exam.  This service lets us provide the care you need with a video conversation.  If a prescription is necessary we can send it directly to your pharmacy.  If lab work is needed we can place an order for that and you can then stop by our lab to have the test done at a later time.    Video visits are billed at different rates depending on your insurance coverage.  Please reach out to your insurance provider with any questions.    If during the course of the call the physician/provider feels a video visit is not appropriate, you will not be charged for this service.\"    Patient has given verbal consent for Video visit? Yes  How would you like to obtain your AVS? Mail a copy  If you are dropped from the video visit, the video invite should be resent to: Text to cell phone: [phone]  Will anyone else be joining your video visit? No  Keyanna Serrano CMA      Video-Visit Details    Type of service:  Video Visit    Video Start Time: 4:30  Video End Time: 4:50    Originating Location (pt. Location): Home    Distant Location (provider location):  [facility] [place]     Platform used for Video Visit: Latrell Calvillo MD      "

## 2020-12-04 NOTE — LETTER
Date:December 28, 2020      Patient was self referred, no letter generated. Do not send.        [facility] Health Information

## 2020-12-10 NOTE — OR NURSING
EGD done in endoscopy department.  Patient tolerated procedure well.  One hemostasis clip placed for ulcer.  Report called to RN.  All vitals stable.   Silver Alvarez)  EEGEpilepsy; Neurology  [address] [place] [address], [address]  [place] [address]  Phone: [phone]  Fax: [phone]  Follow Up Time: 4-6 Days

## 2021-01-24 ENCOUNTER — APPOINTMENT (OUTPATIENT)
Dept: GENERAL RADIOLOGY | Facility: CLINIC | Age: 56
End: 2021-01-24
Attending: EMERGENCY MEDICINE
Payer: COMMERCIAL

## 2021-01-24 ENCOUNTER — HOSPITAL ENCOUNTER (EMERGENCY)
Facility: CLINIC | Age: 56
Discharge: HOME | End: 2021-01-24
Attending: EMERGENCY MEDICINE | Admitting: EMERGENCY MEDICINE
Payer: COMMERCIAL

## 2021-01-24 VITALS
DIASTOLIC BLOOD PRESSURE: 89 MMHG | OXYGEN SATURATION: 100 % | HEART RATE: 89 BPM | BODY MASS INDEX: 31.28 KG/M2 | SYSTOLIC BLOOD PRESSURE: 128 MMHG | TEMPERATURE: 98 F | RESPIRATION RATE: 16 BRPM | WEIGHT: 218 LBS

## 2021-01-24 DIAGNOSIS — M77.11 LATERAL EPICONDYLITIS OF RIGHT ELBOW: ICD-10-CM

## 2021-01-24 PROCEDURE — 99283 EMERGENCY DEPT VISIT LOW MDM: CPT | Performed by: EMERGENCY MEDICINE

## 2021-01-24 PROCEDURE — 73080 X-RAY EXAM OF ELBOW: CPT | Mod: RT

## 2021-01-24 ASSESSMENT — ENCOUNTER SYMPTOMS
JOINT SWELLING: 0
FEVER: 0

## 2021-01-24 NOTE — DISCHARGE INSTRUCTIONS
Please make an appointment to follow up with Your Primary Care Provider if not improving.    Ibuprofen for pain.    I would recommend getting a tennis elbow brace and using it for comfort.    Ice to area of pain.    Return to the emergency department for any problems.

## 2021-01-24 NOTE — ED PROVIDER NOTES
"ED Provider Note  [facility]      History     Chief Complaint   Patient presents with     Arm Pain     Right elbow pain, hurts \"the bone\" to straighten his arm. Started 2 months ago. Denies trauma.      The history is provided by the patient and medical records.   Arm Pain  Associated symptoms: no fever      Augustin Hernandez is a right-hand-dominant 55-year-old male presented to the emergency department complaining of right-sided elbow pain.  He reports that this has been worsening over the past month or 2.  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  He has noticed that his pain is worse when he is doing these movements and also complains of pain that seems to worsen when he extends at his wrist.  He denies any known trauma, fevers, rashes or swelling of the joint.    This part of the medical record was transcribed by Chilo Manning Medical Scribe, from a dictation done by Darin Raines MD.     Past Medical History  Past Medical History:   Diagnosis Date     Blood in semen      Depression      Hypertension      Past Surgical History:   Procedure Laterality Date     ESOPHAGOSCOPY, GASTROSCOPY, DUODENOSCOPY (EGD), COMBINED N/A 1/26/2019    Procedure: COMBINED ESOPHAGOSCOPY, GASTROSCOPY, DUODENOSCOPY (EGD);  Surgeon: Carol Carmichael MD;  Location:  GI     HERNIA REPAIR       No current outpatient medications on file.    No Known Allergies  Family History  No family history on file.  Social History   Social History     Tobacco Use     Smoking status: Former Smoker     Packs/day: 0.50     Smokeless tobacco: Never Used   Substance Use Topics     Alcohol use: No     Drug use: No      Past medical history, past surgical history, medications, allergies, family history, and social history were reviewed with the patient. No additional pertinent items.       Review of Systems   Constitutional: Negative for " fever.   Musculoskeletal: Negative for joint swelling.        Positive for right elbow pain.   Skin: Negative for rash.     A complete review of systems was performed with pertinent positives and negatives noted in the HPI, and all other systems negative.    Physical Exam   BP: 132/88  Pulse: 97  Temp: 98  F (36.7  C)  Resp: 16  Weight: 98.9 kg (218 lb)  SpO2: 99 %  Physical Exam  Vitals signs and nursing note reviewed.   Constitutional:       General: He is not in acute distress.     Appearance: He is well-developed. He is not ill-appearing or toxic-appearing.   HENT:      Head: Normocephalic and atraumatic.   Eyes:      General: No scleral icterus.     Pupils: Pupils are equal, round, and reactive to light.   Neck:      Musculoskeletal: Normal range of motion and neck supple.   Cardiovascular:      Rate and Rhythm: Normal rate.   Pulmonary:      Effort: Pulmonary effort is normal. No respiratory distress.   Musculoskeletal:      Right elbow: He exhibits normal range of motion, no swelling, no effusion, no deformity and no laceration. Tenderness found. Lateral epicondyle tenderness noted.      Comments: Patient has tenderness to palpation in the area of the lateral epicondyle of the right elbow.  Patient has significant increase in pain with wrist extension against force.  No overlying skin changes are noted.   Skin:     General: Skin is warm and dry.      Coloration: Skin is not pale.      Findings: No rash.   Neurological:      Mental Status: He is alert and oriented to person, place, and time.      Sensory: No sensory deficit.   Psychiatric:         Behavior: Behavior normal.         ED Course      Procedures             Results for orders placed or performed during the hospital encounter of 01/24/21   Elbow XR, G/E 3 views, right     Status: None    Narrative    EXAM: XR ELBOW RT G/E 3 VW  LOCATION: [facility]  DATE/TIME: 1/24/2021 2:10 PM    INDICATION: Pain.  COMPARISON: None.      Impression     IMPRESSION: No acute fracture or joint effusion. Distal triceps tendon enthesopathy. Small 6 mm loose body along the posterior elbow joint. Mild lateral to moderate ulnotrochlear degenerative change.     Medications - No data to display     Assessments & Plan (with Medical Decision Making)   This patient presented to the emergency department complaining of right-sided elbow pain.  Exam and history seem consistent with lateral epicondylitis.  X-ray demonstrated no evidence of joint effusion or osseous abnormality.  This point treatment for lateral epicondylitis is recommended and I recommended he follow-up with his primary clinic as he may benefit from PT referral or potentially even steroid injection if he does not improve.  He was discharged with instructions for care and follow-up in good condition.    This part of the medical record was transcribed by Chilo Manning, Medical Scribe, from a dictation done by Darin Raines MD.      I have reviewed the nursing notes. I have reviewed the findings, diagnosis, plan and need for follow up with the patient.    There are no discharge medications for this patient.      Final diagnoses:   Lateral epicondylitis of right elbow       --  Darin Raines MD  [facility] EMERGENCY DEPARTMENT  1/24/2021     Darin Raines MD  01/25/21 0837

## 2022-05-02 ENCOUNTER — TRANSCRIBE ORDERS (OUTPATIENT)
Dept: OTHER | Age: 57
End: 2022-05-02
Payer: COMMERCIAL

## 2022-05-02 DIAGNOSIS — A04.8 INFECTION DUE TO CLARITHROMYCIN RESISTANT HELICOBACTER PYLORI: Primary | ICD-10-CM

## 2022-05-02 DIAGNOSIS — Z16.29 INFECTION DUE TO CLARITHROMYCIN RESISTANT HELICOBACTER PYLORI: Primary | ICD-10-CM

## 2022-05-03 ENCOUNTER — TELEPHONE (OUTPATIENT)
Dept: GASTROENTEROLOGY | Facility: CLINIC | Age: 57
End: 2022-05-03
Payer: COMMERCIAL

## 2023-07-31 ENCOUNTER — ANCILLARY PROCEDURE (OUTPATIENT)
Dept: MRI IMAGING | Facility: CLINIC | Age: 58
End: 2023-07-31
Payer: COMMERCIAL

## 2023-07-31 DIAGNOSIS — M54.2 CERVICALGIA: ICD-10-CM

## 2023-07-31 PROCEDURE — 72141 MRI NECK SPINE W/O DYE: CPT | Performed by: RADIOLOGY

## 2024-01-31 ENCOUNTER — HOSPITAL ENCOUNTER (EMERGENCY)
Facility: CLINIC | Age: 59
Discharge: HOME | End: 2024-01-31
Attending: EMERGENCY MEDICINE | Admitting: EMERGENCY MEDICINE
Payer: COMMERCIAL

## 2024-01-31 VITALS
TEMPERATURE: 98.7 F | OXYGEN SATURATION: 96 % | DIASTOLIC BLOOD PRESSURE: 96 MMHG | RESPIRATION RATE: 18 BRPM | HEART RATE: 74 BPM | SYSTOLIC BLOOD PRESSURE: 146 MMHG

## 2024-01-31 DIAGNOSIS — J10.1 INFLUENZA B: ICD-10-CM

## 2024-01-31 LAB
FLUAV RNA SPEC QL NAA+PROBE: NEGATIVE
FLUBV RNA RESP QL NAA+PROBE: POSITIVE
RSV RNA SPEC NAA+PROBE: NEGATIVE
SARS-COV-2 RNA RESP QL NAA+PROBE: NEGATIVE

## 2024-01-31 PROCEDURE — 99283 EMERGENCY DEPT VISIT LOW MDM: CPT

## 2024-01-31 PROCEDURE — 87637 SARSCOV2&INF A&B&RSV AMP PRB: CPT | Performed by: STUDENT IN AN ORGANIZED HEALTH CARE EDUCATION/TRAINING PROGRAM

## 2024-01-31 PROCEDURE — 87637 SARSCOV2&INF A&B&RSV AMP PRB: CPT | Performed by: EMERGENCY MEDICINE

## 2024-01-31 RX ORDER — ACETAMINOPHEN 325 MG/1
975 TABLET ORAL ONCE
Status: DISCONTINUED | OUTPATIENT
Start: 2024-01-31 | End: 2024-02-01 | Stop reason: HOSPADM

## 2024-01-31 ASSESSMENT — ACTIVITIES OF DAILY LIVING (ADL): ADLS_ACUITY_SCORE: 33

## 2024-01-31 NOTE — LETTER
January 31, 2024      To Whom It May Concern:      Augustin Hernandez was seen in our Emergency Department today, 01/31/24.  I expect his condition to improve over the next few days.  He may return to work on 02/05/2024.     Sincerely,        Faye Drew RN

## 2024-02-01 NOTE — ED PROVIDER NOTES
History     Chief Complaint:  Muscle Pain       HPI   Augustin Hernandez is a 58 year old male with history of hypertension who presents to the ED for evaluation of muscle pain. He reports having lower back pain all days yesterday along with nasal congestion for the past three days. Patient is a . No use of alcohol or drugs.      Independent Historian:    None - Patient only    Review of External Notes:  Neurosurgery office visit from August of last year was reviewed    Medications:    Lisinopril  Anaprox  Oxycodone    Past Medical History:    Depression   Hypertension   Blood in semen  GERD  Anxiety  Nephrolithiasis  Chronic kidney disease stage 3    Past Surgical History:    EGD, combined  Hernia repair      Physical Exam   Patient Vitals for the past 24 hrs:   BP Temp Temp src Pulse Resp SpO2   01/31/24 2126 (!) 146/96 98.7  F (37.1  C) Temporal 74 18 96 %      Physical Exam  General: Alert, interactive in mild distress  Head:  Scalp is atraumatic  Eyes:  The pupils are equal, round, and reactive to light    EOM's intact    No scleral icterus  ENT:      Nose:  The external nose is normal  Ears:  External ears are normal      Neck:  Normal range of motion.      There is no rigidity.    Trachea is in the midline         CV:  Regular rate and rhythm    No murmur   Resp:  Breath sounds are clear bilaterally    Non-labored, no retractions or accessory muscle use       MS:  Normal strength in all 4 extremities  Skin:  Warm and dry, No rash or lesions noted.  Neuro:   Strength 5/5 x4.      GCS: 15  Psych: Awake. Alert.  Normal affect.      Appropriate interactions.    Emergency Department Course     Laboratory:  Labs Ordered and Resulted from Time of ED Arrival to Time of ED Departure   INFLUENZA A/B, RSV, & SARS-COV2 PCR - Abnormal       Result Value    Influenza A PCR Negative      Influenza B PCR Positive (*)     RSV PCR Negative      SARS CoV2 PCR Negative        Procedures    None    Emergency Department Course & Assessments:       Interventions:  Medications   acetaminophen (TYLENOL) tablet 975 mg (has no administration in time range)        Independent Interpretation (X-rays, CTs, rhythm strip):  None    Assessments/Consultations/Discussion of Management or Tests:  ED Course as of 01/31/24 2352 Wed Jan 31, 2024 2341 I obtained history and examined the patient as noted above.    2345 I prepared the patient to be discharged home.      Social Determinants of Health affecting care:  None     Disposition:  The patient was discharged to home.     Impression & Plan      Medical Decision Making:  Augustin Hernandez is a 58 year old male who presents for evaluation of cough, fever and myalgias.    This is consistent with influenza.   The patient is out of treatment window for influenza and medications ordered as noted above.  They are at risk for pneumonia but no signs of this are detected on today's visit.  Close followup of primary care physician is indicated and return to the ED for high fevers > 103 for more than 48 hours more, increasing productive cough, shortness of breath, or confusion.  There is no signs of serious bacterial infection such as bacteremia, meningitis, UTI/pyelonephritis, strep pharyngitis, etc.        Diagnosis:    ICD-10-CM    1. Influenza B  J10.1 Primary Care Referral           Discharge Medications:  There are no discharge medications for this patient.     Scribe Disclosure:  I, Rehana Cox, am serving as a scribe at 11:46 PM on 1/31/2024 to document services personally performed by Jim Moreland MD based on my observations and the provider's statements to me.  1/31/2024   Jim Moreland MD Trigger, Brandon Thomas, MD  02/01/24 7070

## 2024-02-01 NOTE — ED TRIAGE NOTES
[facility]  ED Arrival Note    Arrives through triage. ABC's intact. A &O X4. . Pt arrives with c/o muscle pain and nasal congestion. Patient is  and is concerned about having the flu.       Visitors during triage: None      Triage Interventions: COVID Test    Ambulatory: Yes    Meets Stroke Criteria?: No    Meets Trauma Criteria?: No    Shock Index: <0.8, for provider reference    Directed to: Triage Lobby    Pronouns: he/him       Triage Assessment (Adult)       Row Name 01/31/24 2126          Triage Assessment    Airway WDL WDL        Respiratory WDL    Respiratory WDL WDL        Skin Circulation/Temperature WDL    Skin Circulation/Temperature WDL WDL        Cardiac WDL    Cardiac WDL WDL        Peripheral/Neurovascular WDL    Peripheral Neurovascular WDL WDL        Cognitive/Neuro/Behavioral WDL    Cognitive/Neuro/Behavioral WDL WDL

## (undated) RX ORDER — FENTANYL CITRATE 50 UG/ML
INJECTION, SOLUTION INTRAMUSCULAR; INTRAVENOUS
Status: DISPENSED
Start: 2019-01-26